# Patient Record
Sex: MALE | Race: BLACK OR AFRICAN AMERICAN | Employment: OTHER | URBAN - METROPOLITAN AREA
[De-identification: names, ages, dates, MRNs, and addresses within clinical notes are randomized per-mention and may not be internally consistent; named-entity substitution may affect disease eponyms.]

---

## 2018-04-17 DIAGNOSIS — K80.20 GALLSTONES: Primary | ICD-10-CM

## 2018-04-17 DIAGNOSIS — R10.11 RIGHT UPPER QUADRANT ABDOMINAL PAIN: ICD-10-CM

## 2018-04-26 ENCOUNTER — HOSPITAL ENCOUNTER (OUTPATIENT)
Dept: ULTRASOUND IMAGING | Age: 63
Discharge: HOME | End: 2018-04-26
Attending: SURGERY | Admitting: SURGERY

## 2018-04-26 DIAGNOSIS — K80.20 GALLSTONES: ICD-10-CM

## 2018-04-26 DIAGNOSIS — K80.20 CALCULUS OF GALLBLADDER WITHOUT CHOLECYSTITIS WITHOUT OBSTRUCTION: ICD-10-CM

## 2018-04-26 DIAGNOSIS — R10.11 RIGHT UPPER QUADRANT ABDOMINAL PAIN: ICD-10-CM

## 2018-05-01 ENCOUNTER — TELEPHONE (OUTPATIENT)
Dept: BARIATRICS/WEIGHT MGMT | Age: 63
End: 2018-05-01

## 2018-05-17 ENCOUNTER — INITIAL CONSULT (OUTPATIENT)
Dept: BARIATRICS/WEIGHT MGMT | Age: 63
End: 2018-05-17

## 2018-05-17 VITALS
BODY MASS INDEX: 28.86 KG/M2 | HEIGHT: 70 IN | DIASTOLIC BLOOD PRESSURE: 77 MMHG | SYSTOLIC BLOOD PRESSURE: 125 MMHG | WEIGHT: 201.6 LBS | HEART RATE: 78 BPM

## 2018-05-17 DIAGNOSIS — R10.11 RIGHT UPPER QUADRANT ABDOMINAL PAIN: ICD-10-CM

## 2018-05-17 DIAGNOSIS — K80.10 CHRONIC CHOLECYSTITIS WITH CALCULUS: Primary | ICD-10-CM

## 2018-05-17 PROCEDURE — 4040F PNEUMOC VAC/ADMIN/RCVD: CPT | Performed by: SURGERY

## 2018-05-17 PROCEDURE — G8427 DOCREV CUR MEDS BY ELIG CLIN: HCPCS | Performed by: SURGERY

## 2018-05-17 PROCEDURE — 99204 OFFICE O/P NEW MOD 45 MIN: CPT | Performed by: SURGERY

## 2018-05-17 PROCEDURE — 3017F COLORECTAL CA SCREEN DOC REV: CPT | Performed by: SURGERY

## 2018-05-17 PROCEDURE — G8419 CALC BMI OUT NRM PARAM NOF/U: HCPCS | Performed by: SURGERY

## 2018-05-17 PROCEDURE — 4004F PT TOBACCO SCREEN RCVD TLK: CPT | Performed by: SURGERY

## 2018-05-17 PROCEDURE — 1123F ACP DISCUSS/DSCN MKR DOCD: CPT | Performed by: SURGERY

## 2018-05-29 ENCOUNTER — TELEPHONE (OUTPATIENT)
Dept: BARIATRICS/WEIGHT MGMT | Age: 63
End: 2018-05-29

## 2018-05-30 ENCOUNTER — TELEPHONE (OUTPATIENT)
Dept: SURGERY | Age: 63
End: 2018-05-30

## 2018-05-31 ENCOUNTER — HOSPITAL ENCOUNTER (OUTPATIENT)
Dept: PREADMISSION TESTING | Age: 63
Discharge: HOME | End: 2018-06-01
Attending: SURGERY | Admitting: SURGERY

## 2018-05-31 VITALS — WEIGHT: 201 LBS | HEIGHT: 70 IN | BODY MASS INDEX: 28.77 KG/M2

## 2018-05-31 RX ORDER — HEPARIN SODIUM 5000 [USP'U]/ML
5000 INJECTION, SOLUTION INTRAVENOUS; SUBCUTANEOUS ONCE
Status: CANCELLED | OUTPATIENT
Start: 2018-05-31 | End: 2018-05-31

## 2018-06-01 PROBLEM — K80.20 SYMPTOMATIC CHOLELITHIASIS: Status: ACTIVE | Noted: 2018-06-01

## 2018-06-05 ENCOUNTER — TELEPHONE (OUTPATIENT)
Dept: BARIATRICS/WEIGHT MGMT | Age: 63
End: 2018-06-05

## 2018-06-05 DIAGNOSIS — Z90.49 S/P LAPAROSCOPIC CHOLECYSTECTOMY: Primary | ICD-10-CM

## 2018-06-06 RX ORDER — OXYCODONE AND ACETAMINOPHEN 10; 325 MG/1; MG/1
1 TABLET ORAL EVERY 6 HOURS PRN
Qty: 28 TABLET | Refills: 0 | Status: SHIPPED | OUTPATIENT
Start: 2018-06-06 | End: 2018-06-12 | Stop reason: CLARIF

## 2018-06-12 ENCOUNTER — TELEPHONE (OUTPATIENT)
Dept: SURGERY | Age: 63
End: 2018-06-12

## 2018-06-12 PROBLEM — R10.9 ABDOMINAL PAIN: Status: ACTIVE | Noted: 2018-06-12

## 2018-06-13 PROBLEM — K56.7 ILEUS (HCC): Status: ACTIVE | Noted: 2018-06-13

## 2018-06-27 ENCOUNTER — OFFICE VISIT (OUTPATIENT)
Dept: BARIATRICS/WEIGHT MGMT | Age: 63
End: 2018-06-27

## 2018-06-27 VITALS
WEIGHT: 200 LBS | SYSTOLIC BLOOD PRESSURE: 105 MMHG | HEIGHT: 71 IN | DIASTOLIC BLOOD PRESSURE: 67 MMHG | HEART RATE: 81 BPM | BODY MASS INDEX: 28 KG/M2

## 2018-06-27 DIAGNOSIS — Z90.49 S/P LAPAROSCOPIC CHOLECYSTECTOMY: Primary | ICD-10-CM

## 2018-06-27 PROCEDURE — 99024 POSTOP FOLLOW-UP VISIT: CPT | Performed by: SURGERY

## 2018-08-15 ENCOUNTER — APPOINTMENT (OUTPATIENT)
Dept: CT IMAGING | Age: 63
End: 2018-08-15
Payer: MEDICARE

## 2018-08-15 ENCOUNTER — HOSPITAL ENCOUNTER (EMERGENCY)
Age: 63
Discharge: HOME | End: 2018-08-16
Attending: EMERGENCY MEDICINE
Payer: MEDICARE

## 2018-08-15 DIAGNOSIS — R10.9 FLANK PAIN: Primary | ICD-10-CM

## 2018-08-15 LAB
ANION GAP SERPL CALCULATED.3IONS-SCNC: 11 MMOL/L (ref 3–16)
BASOPHILS ABSOLUTE: 0 K/UL (ref 0–0.2)
BASOPHILS RELATIVE PERCENT: 0.2 %
BUN BLDV-MCNC: 11 MG/DL (ref 7–20)
CALCIUM SERPL-MCNC: 9.8 MG/DL (ref 8.3–10.6)
CHLORIDE BLD-SCNC: 101 MMOL/L (ref 99–110)
CO2: 27 MMOL/L (ref 21–32)
CREAT SERPL-MCNC: 1 MG/DL (ref 0.8–1.3)
EOSINOPHILS ABSOLUTE: 0.2 K/UL (ref 0–0.6)
EOSINOPHILS RELATIVE PERCENT: 4.7 %
GFR AFRICAN AMERICAN: >60
GFR NON-AFRICAN AMERICAN: >60
GLUCOSE BLD-MCNC: 88 MG/DL (ref 70–99)
HCT VFR BLD CALC: 40.2 % (ref 40.5–52.5)
HEMOGLOBIN: 13.4 G/DL (ref 13.5–17.5)
LYMPHOCYTES ABSOLUTE: 2 K/UL (ref 1–5.1)
LYMPHOCYTES RELATIVE PERCENT: 44 %
MCH RBC QN AUTO: 33.2 PG (ref 26–34)
MCHC RBC AUTO-ENTMCNC: 33.2 G/DL (ref 31–36)
MCV RBC AUTO: 99.8 FL (ref 80–100)
MONOCYTES ABSOLUTE: 0.4 K/UL (ref 0–1.3)
MONOCYTES RELATIVE PERCENT: 9.3 %
NEUTROPHILS ABSOLUTE: 1.8 K/UL (ref 1.7–7.7)
NEUTROPHILS RELATIVE PERCENT: 41.8 %
PDW BLD-RTO: 14 % (ref 12.4–15.4)
PLATELET # BLD: 128 K/UL (ref 135–450)
PMV BLD AUTO: 9.7 FL (ref 5–10.5)
POTASSIUM SERPL-SCNC: 3.9 MMOL/L (ref 3.5–5.1)
RBC # BLD: 4.03 M/UL (ref 4.2–5.9)
SODIUM BLD-SCNC: 139 MMOL/L (ref 136–145)
WBC # BLD: 4.4 K/UL (ref 4–11)

## 2018-08-15 PROCEDURE — 96374 THER/PROPH/DIAG INJ IV PUSH: CPT

## 2018-08-15 PROCEDURE — 2580000003 HC RX 258: Performed by: PHYSICIAN ASSISTANT

## 2018-08-15 PROCEDURE — 74176 CT ABD & PELVIS W/O CONTRAST: CPT

## 2018-08-15 PROCEDURE — 96361 HYDRATE IV INFUSION ADD-ON: CPT

## 2018-08-15 PROCEDURE — 85025 COMPLETE CBC W/AUTO DIFF WBC: CPT

## 2018-08-15 PROCEDURE — 80048 BASIC METABOLIC PNL TOTAL CA: CPT

## 2018-08-15 PROCEDURE — 6360000002 HC RX W HCPCS: Performed by: PHYSICIAN ASSISTANT

## 2018-08-15 PROCEDURE — 99284 EMERGENCY DEPT VISIT MOD MDM: CPT

## 2018-08-15 RX ORDER — KETOROLAC TROMETHAMINE 30 MG/ML
15 INJECTION, SOLUTION INTRAMUSCULAR; INTRAVENOUS ONCE
Status: COMPLETED | OUTPATIENT
Start: 2018-08-15 | End: 2018-08-15

## 2018-08-15 RX ORDER — 0.9 % SODIUM CHLORIDE 0.9 %
1000 INTRAVENOUS SOLUTION INTRAVENOUS ONCE
Status: COMPLETED | OUTPATIENT
Start: 2018-08-15 | End: 2018-08-15

## 2018-08-15 RX ORDER — MORPHINE SULFATE 4 MG/ML
4 INJECTION, SOLUTION INTRAMUSCULAR; INTRAVENOUS
Status: DISCONTINUED | OUTPATIENT
Start: 2018-08-15 | End: 2018-08-15

## 2018-08-15 RX ADMIN — KETOROLAC TROMETHAMINE 15 MG: 30 INJECTION, SOLUTION INTRAMUSCULAR at 22:32

## 2018-08-15 RX ADMIN — SODIUM CHLORIDE 1000 ML: 9 INJECTION, SOLUTION INTRAVENOUS at 22:23

## 2018-08-15 ASSESSMENT — PAIN DESCRIPTION - FREQUENCY: FREQUENCY: CONTINUOUS

## 2018-08-15 ASSESSMENT — PAIN SCALES - WONG BAKER: WONGBAKER_NUMERICALRESPONSE: 0

## 2018-08-15 ASSESSMENT — PAIN SCALES - GENERAL
PAINLEVEL_OUTOF10: 10
PAINLEVEL_OUTOF10: 10

## 2018-08-15 ASSESSMENT — ENCOUNTER SYMPTOMS
NAUSEA: 0
VOMITING: 0
DIARRHEA: 0
ABDOMINAL PAIN: 0
BACK PAIN: 1

## 2018-08-15 ASSESSMENT — PAIN DESCRIPTION - ONSET: ONSET: ON-GOING

## 2018-08-15 ASSESSMENT — PAIN DESCRIPTION - DESCRIPTORS: DESCRIPTORS: SHARP

## 2018-08-15 ASSESSMENT — PAIN DESCRIPTION - PROGRESSION: CLINICAL_PROGRESSION: GRADUALLY WORSENING

## 2018-08-15 ASSESSMENT — PAIN DESCRIPTION - ORIENTATION: ORIENTATION: RIGHT

## 2018-08-15 ASSESSMENT — PAIN DESCRIPTION - PAIN TYPE: TYPE: ACUTE PAIN

## 2018-08-15 ASSESSMENT — PAIN DESCRIPTION - LOCATION: LOCATION: FLANK

## 2018-08-16 VITALS
OXYGEN SATURATION: 100 % | DIASTOLIC BLOOD PRESSURE: 88 MMHG | SYSTOLIC BLOOD PRESSURE: 176 MMHG | HEART RATE: 63 BPM | RESPIRATION RATE: 16 BRPM | TEMPERATURE: 97.9 F

## 2018-08-16 LAB
BILIRUBIN URINE: NEGATIVE MG/DL
BLOOD, URINE: ABNORMAL
CLARITY: ABNORMAL
COLOR: ABNORMAL
GLUCOSE URINE: NEGATIVE MG/DL
KETONES, URINE: NEGATIVE MG/DL
LEUKOCYTE ESTERASE, URINE: NEGATIVE
MICROSCOPIC EXAMINATION: YES
NITRITE, URINE: NEGATIVE
PH UA: 7
PROTEIN UA: NEGATIVE MG/DL
RBC UA: ABNORMAL /HPF (ref 0–2)
SPECIFIC GRAVITY UA: 1.01
UROBILINOGEN, URINE: 0.2 E.U./DL
WBC UA: ABNORMAL /HPF (ref 0–5)

## 2018-08-16 PROCEDURE — 81001 URINALYSIS AUTO W/SCOPE: CPT

## 2018-08-16 PROCEDURE — 6370000000 HC RX 637 (ALT 250 FOR IP): Performed by: PHYSICIAN ASSISTANT

## 2018-08-16 RX ORDER — LIDOCAINE 50 MG/G
1 PATCH TOPICAL DAILY
Qty: 30 PATCH | Refills: 0 | Status: SHIPPED | OUTPATIENT
Start: 2018-08-16

## 2018-08-16 RX ORDER — LIDOCAINE 50 MG/G
1 PATCH TOPICAL DAILY
Status: DISCONTINUED | OUTPATIENT
Start: 2018-08-16 | End: 2018-08-16 | Stop reason: HOSPADM

## 2018-08-16 RX ORDER — LIDOCAINE 50 MG/G
1 PATCH TOPICAL DAILY
Status: DISCONTINUED | OUTPATIENT
Start: 2018-08-16 | End: 2018-08-16

## 2018-08-16 RX ORDER — NAPROXEN 375 MG/1
375 TABLET ORAL 2 TIMES DAILY WITH MEALS
Qty: 14 TABLET | Refills: 0 | Status: SHIPPED | OUTPATIENT
Start: 2018-08-16 | End: 2021-07-27 | Stop reason: ALTCHOICE

## 2018-08-16 NOTE — ED PROVIDER NOTES
HFA) 108 (90 BASE) MCG/ACT INHALER    Inhale 2 puffs into the lungs every 6 hours as needed for Wheezing or Shortness of Breath. CALCIUM CARBONATE-VITAMIN D (CALCIUM 500/D) 500-125 MG-UNIT TABS    Take by mouth    DIAZEPAM (VALIUM) 10 MG TABLET    Take 1 tablet by mouth 3 times daily    DOCUSATE SODIUM (COLACE) 100 MG CAPSULE    Take 1 capsule by mouth 2 times daily. LITHIUM (LITHOBID) 300 MG CR TABLET    Take 2 tablets by mouth 2 times daily    PSYLLIUM 63 % POWD    Take 7.5 mLs by mouth daily. QUETIAPINE (SEROQUEL) 300 MG TABLET    1-2 tablets nightly as tolerated    TERAZOSIN (HYTRIN) 2 MG CAPSULE    Take 1 capsule by mouth nightly. Allergies     He is allergic to morphine. Physical Exam     INITIAL VITALS: BP: (!) 152/84, Temp: 97.9 °F (36.6 °C), Pulse: 69, Resp: 16, SpO2: 99 %   Physical Exam   Constitutional: He is oriented to person, place, and time. He appears well-developed and well-nourished. No distress. HENT:   Head: Normocephalic and atraumatic. Eyes: Conjunctivae are normal.   Neck: Neck supple. Cardiovascular: Normal rate. Pulmonary/Chest: Effort normal.   Abdominal: Soft. There is tenderness. +R CVAT   Neurological: He is alert and oriented to person, place, and time. Skin: Skin is warm and dry. No rash noted. Psychiatric: He has a normal mood and affect. His behavior is normal.   Nursing note and vitals reviewed. Diagnostic Results     RADIOLOGY:  CT ABDOMEN PELVIS WO CONTRAST Additional Contrast? None   Final Result      1. No acute abdomen normality of abdomen or pelvis   2.  No urinary calculi or hydronephrosis        LABS:   Results for orders placed or performed during the hospital encounter of 08/15/18   CBC auto differential   Result Value Ref Range    WBC 4.4 4.0 - 11.0 K/uL    RBC 4.03 (L) 4.20 - 5.90 M/uL    Hemoglobin 13.4 (L) 13.5 - 17.5 g/dL    Hematocrit 40.2 (L) 40.5 - 52.5 %    MCV 99.8 80.0 - 100.0 fL    MCH 33.2 26.0 - 34.0 pg    MCHC 33.2 31.0 - 36.0 g/dL    RDW 14.0 12.4 - 15.4 %    Platelets 763 (L) 523 - 450 K/uL    MPV 9.7 5.0 - 10.5 fL    Neutrophils % 41.8 %    Lymphocytes % 44.0 %    Monocytes % 9.3 %    Eosinophils % 4.7 %    Basophils % 0.2 %    Neutrophils # 1.8 1.7 - 7.7 K/uL    Lymphocytes # 2.0 1.0 - 5.1 K/uL    Monocytes # 0.4 0.0 - 1.3 K/uL    Eosinophils # 0.2 0.0 - 0.6 K/uL    Basophils # 0.0 0.0 - 0.2 K/uL   Basic Metabolic Panel   Result Value Ref Range    Sodium 139 136 - 145 mmol/L    Potassium 3.9 3.5 - 5.1 mmol/L    Chloride 101 99 - 110 mmol/L    CO2 27 21 - 32 mmol/L    Anion Gap 11 3 - 16    Glucose 88 70 - 99 mg/dL    BUN 11 7 - 20 mg/dL    CREATININE 1.0 0.8 - 1.3 mg/dL    GFR Non-African American >60 >60    GFR African American >60 >60    Calcium 9.8 8.3 - 10.6 mg/dL   POC URINE with Microscopic   Result Value Ref Range    Color, UA Not Entered Straw/Yellow    Clarity, UA Not Entered Clear    Glucose, Ur Negative Negative mg/dL    Bilirubin Urine Negative Negative mg/dL    Ketones, Urine Negative Negative mg/dL    Specific Gravity, UA 1.010 1.005 - 1.030    Blood, Urine TRACE-INTACT (A) Negative    pH, UA 7.0 5.0 - 8.0    Protein, UA Negative Negative mg/dL    Urobilinogen, Urine 0.2 <2.0 E.U./dL    Nitrite, Urine Negative Negative    Leukocyte Esterase, Urine Negative Negative    Microscopic Examination Yes        RECENT VITALS:  BP: 130/85, Temp: 97.9 °F (36.6 °C), Pulse: 63, Resp: 16, SpO2: 100 %     Procedures     None    ED Course     Nursing Notes, Past Medical Hx, Past Surgical Hx, Social Hx, Allergies, and Family Hx were reviewed.     The patient was given the following medications:  Orders Placed This Encounter   Medications    DISCONTD: morphine (PF) injection 4 mg    0.9 % sodium chloride bolus    ketorolac (TORADOL) injection 15 mg    naproxen (NAPROSYN) 375 MG tablet     Sig: Take 1 tablet by mouth 2 times daily (with meals) for 7 days     Dispense:  14 tablet     Refill:  0    lidocaine (LIDODERM) 5 %     Sig:

## 2018-08-16 NOTE — ED PROVIDER NOTES
ED Attending Attestation Note     Date of evaluation: 8/15/2018    This patient was seen by the advance practice provider. I have seen and examined the patient, agree with the workup, evaluation, management and diagnosis. The care plan has been discussed. My assessment reveals A patient who presents with a multiple week history of right-sided discomfort. On my examination it is worse with palpation and movement. The pain appears to be musculoskeletal in nature. He is on multiple medications including oxycodone, Valium, lithium, and Seroquel. Dipti Hamm MD  08/15/18 [id number]

## 2018-10-23 ENCOUNTER — APPOINTMENT (OUTPATIENT)
Dept: GENERAL RADIOLOGY | Age: 63
End: 2018-10-23
Payer: MEDICARE

## 2018-10-23 ENCOUNTER — HOSPITAL ENCOUNTER (EMERGENCY)
Age: 63
Discharge: HOME | End: 2018-10-23
Attending: EMERGENCY MEDICINE
Payer: MEDICARE

## 2018-10-23 VITALS
OXYGEN SATURATION: 98 % | SYSTOLIC BLOOD PRESSURE: 153 MMHG | WEIGHT: 200 LBS | RESPIRATION RATE: 13 BRPM | HEART RATE: 79 BPM | TEMPERATURE: 98.5 F | DIASTOLIC BLOOD PRESSURE: 86 MMHG | BODY MASS INDEX: 28 KG/M2 | HEIGHT: 71 IN

## 2018-10-23 DIAGNOSIS — S73.005A DISLOCATION OF LEFT HIP, INITIAL ENCOUNTER (HCC): Primary | ICD-10-CM

## 2018-10-23 PROCEDURE — 6360000002 HC RX W HCPCS

## 2018-10-23 PROCEDURE — 94761 N-INVAS EAR/PLS OXIMETRY MLT: CPT

## 2018-10-23 PROCEDURE — 94664 DEMO&/EVAL PT USE INHALER: CPT

## 2018-10-23 PROCEDURE — 2700000000 HC OXYGEN THERAPY PER DAY

## 2018-10-23 PROCEDURE — 99284 EMERGENCY DEPT VISIT MOD MDM: CPT

## 2018-10-23 PROCEDURE — 73502 X-RAY EXAM HIP UNI 2-3 VIEWS: CPT

## 2018-10-23 PROCEDURE — 73501 X-RAY EXAM HIP UNI 1 VIEW: CPT

## 2018-10-23 PROCEDURE — 4500000024 HC ED LEVEL 4 PROCEDURE

## 2018-10-23 PROCEDURE — 94770 HC ETCO2 MONITOR DAILY: CPT

## 2018-10-23 RX ORDER — SODIUM CHLORIDE 9 MG/ML
INJECTION, SOLUTION INTRAVENOUS
Status: DISCONTINUED
Start: 2018-10-23 | End: 2018-10-23 | Stop reason: HOSPADM

## 2018-10-23 RX ORDER — PROPOFOL 10 MG/ML
INJECTION, EMULSION INTRAVENOUS
Status: COMPLETED
Start: 2018-10-23 | End: 2018-10-23

## 2018-10-23 RX ADMIN — PROPOFOL 40 MG: 10 INJECTION, EMULSION INTRAVENOUS at 14:18

## 2018-10-23 RX ADMIN — PROPOFOL 40 MG: 10 INJECTION, EMULSION INTRAVENOUS at 14:16

## 2018-10-23 RX ADMIN — PROPOFOL 20 MG: 10 INJECTION, EMULSION INTRAVENOUS at 14:19

## 2018-10-23 ASSESSMENT — PAIN SCALES - GENERAL
PAINLEVEL_OUTOF10: 8
PAINLEVEL_OUTOF10: 7

## 2018-10-23 ASSESSMENT — PAIN SCALES - WONG BAKER: WONGBAKER_NUMERICALRESPONSE: 8

## 2018-10-23 ASSESSMENT — PAIN DESCRIPTION - LOCATION: LOCATION: HIP

## 2018-10-23 ASSESSMENT — PAIN DESCRIPTION - DESCRIPTORS: DESCRIPTORS: THROBBING

## 2018-10-23 ASSESSMENT — PAIN DESCRIPTION - ORIENTATION: ORIENTATION: LEFT

## 2018-10-23 NOTE — PROGRESS NOTES
ETCO2  Monitoring done for conscious sedation. Patient is on 3 liters/min via nasal cannula for procedure.     Baseline information:  HR: 85 BP: 143/79  RR: 25 LOC: alert  ETCO2: 36 SpO2: 100    5 minutes after sedation administered:  HR: 96 BP: 154/86  RR: 22 LOC: lethargic  ETCO2: 21 SpO2: 99    Post-Procedure:  HR: 86 BP: 152/69  RR: 17 LOC: alert  ETCO2: 39 SpO2: 100  well    Patient/caregiver was educated on the proper method of use:  Yes      Level of patient/caregiver understanding able to:   [] Verbalize understanding   [x] Demonstrate understanding       [] Teach back        [] Needs reinforcement       []  No available caregiver               []  Other:     Response to education:  Good

## 2018-10-23 NOTE — ED PROVIDER NOTES
(90 BASE) MCG/ACT INHALER    Inhale 2 puffs into the lungs every 6 hours as needed for Wheezing or Shortness of Breath. CALCIUM CARBONATE-VITAMIN D (CALCIUM 500/D) 500-125 MG-UNIT TABS    Take by mouth    DIAZEPAM (VALIUM) 10 MG TABLET    Take 1 tablet by mouth 3 times daily    DOCUSATE SODIUM (COLACE) 100 MG CAPSULE    Take 1 capsule by mouth 2 times daily. LIDOCAINE (LIDODERM) 5 %    Place 1 patch onto the skin daily 12 hours on, 12 hours off. LITHIUM (LITHOBID) 300 MG CR TABLET    Take 2 tablets by mouth 2 times daily    NAPROXEN (NAPROSYN) 375 MG TABLET    Take 1 tablet by mouth 2 times daily (with meals) for 7 days    PSYLLIUM 63 % POWD    Take 7.5 mLs by mouth daily. QUETIAPINE (SEROQUEL) 300 MG TABLET    1-2 tablets nightly as tolerated    TERAZOSIN (HYTRIN) 2 MG CAPSULE    Take 1 capsule by mouth nightly. Allergies     He is allergic to morphine. Physical Exam     INITIAL VITALS: BP: (!) 148/71, Temp: 98.5 °F (36.9 °C), Pulse: 81, Resp: 18, SpO2: 100 %   Physical Exam   Constitutional: He is oriented to person, place, and time. No distress. HENT:   Head: Normocephalic. Eyes: Pupils are equal, round, and reactive to light. Neck: Neck supple. Pulmonary/Chest: Effort normal.   Abdominal: Soft. Musculoskeletal:   Pain over left hip with movement but recently had surgery for hip replacement. Suture line looks good. Steri-Strips in place. No erythema or warmth   Neurological: He is alert and oriented to person, place, and time. Skin: Skin is warm and dry. Positive mild shortening of that leg with rotation internally    Diagnostic Results     EKG       RADIOLOGY:  XR HIP 1 VW W PELVIS LEFT   Final Result   Findings/impression: Interval reduction  of the previously dislocated left hip arthroplasty. The right hip arthroplasty appears to be intact. XR HIP 2-3 VW W PELVIS LEFT   Final Result          LABS:   No results found for this visit on 10/23/18.     ED BEDSIDE complications      Left hip was relocated by resident under my direct supervision. Traction countertraction with audible and palpable click. Neurovascular intact after reduction. ED Course     Nursing Notes, Past Medical Hx, Past Surgical Hx, Social Hx,Allergies, and Family Hx were reviewed. The patient was given the following medications:  Orders Placed This Encounter   Medications    sodium chloride 0.9 % infusion     JOSEPH TODD HUNTER: cabinet override    propofol 200 MG/20ML injection     CHIRAG KUHN: cabinet override       CONSULTS:  None    MEDICAL DECISIONMAKING / ASSESSMENT / Britney Jesu is a 70 y.o. male who presented with left hip dislocation. I discussed the case in detail with his surgeon at the [facility] in [place]. Patient is noncompliant with rehab in addition to history of cocaine use in his history. Patient had moderate sedation followed by reduction relocation of the left hip and repeat imaging shows in place. He was instructed to call his doctor at the [facility] today to be follow-up in the next day or 2. He verbalized understanding and will be discharged in good condition. Family is at [facility] understands and agrees with plan. Clinical Impression     1. Dislocation of left hip, initial encounter [facility])        Disposition     PATIENT REFERRED TO:    Follow-up with your orthopedic doctor at the [facility].  Call for appointment to be seen tomorrow or the next day.   Schedule an appointment as soon as possible for a visit         DISCHARGE MEDICATIONS:  New Prescriptions    No medications on file       DISPOSITION Decision To Discharge 10/23/2018 02:41:09 PM       Mamie Monae MD  10/23/18 [id number]

## 2019-06-03 ENCOUNTER — OFFICE VISIT (OUTPATIENT)
Dept: ORTHOPEDIC SURGERY | Age: 64
End: 2019-06-03
Payer: OTHER GOVERNMENT

## 2019-06-03 VITALS — HEIGHT: 71 IN | BODY MASS INDEX: 27.99 KG/M2 | WEIGHT: 199.96 LBS

## 2019-06-03 DIAGNOSIS — Z96.652 HISTORY OF TOTAL LEFT KNEE REPLACEMENT: ICD-10-CM

## 2019-06-03 DIAGNOSIS — S73.005S DISLOCATION OF LEFT HIP, SEQUELA: ICD-10-CM

## 2019-06-03 DIAGNOSIS — R52 PAIN: Primary | ICD-10-CM

## 2019-06-03 DIAGNOSIS — Z96.642 HISTORY OF TOTAL LEFT HIP REPLACEMENT: ICD-10-CM

## 2019-06-03 PROCEDURE — 99203 OFFICE O/P NEW LOW 30 MIN: CPT | Performed by: ORTHOPAEDIC SURGERY

## 2019-06-03 NOTE — PROGRESS NOTES
CHIEF COMPLAINT:    Chief Complaint   Patient presents with    Knee Pain     LEFT KNEE. [facility]. HISTORY OF PRESENT ILLNESS:                The patient is a 75 y.o. male who presents to clinic for evaluation of left knee pain. He has a history of total knee replacement performed about 15 years ago at the [facility]. Is done very well with this hip replacement until about one year ago. We did not time frame, he underwent an ipsilateral total hip replacement which, from the patient's story, sounds like it became infected immediately postop. He underwent a liner exchange followed by IV and oral antibiotics. He states that he then suffered a dislocation with this hip and has had a total of 4 dislocations of this hip with the most recent being in February 2019. Since his most recent dislocation, he has been in multiple nursing homes for rehabilitation. Recently, he has noticed pain in the knee during his physical therapy. He reports of valgus of instability with the knee.     Past Medical History:   Diagnosis Date    Anxiety 8/27/2014    Bipolar affective disorder ([address] 8/27/2014    Chronic back pain 8/27/2014    Chronic pain syndrome 8/27/2014    Constipation 8/27/2014    Depression 8/27/2014    Frequent falls 8/27/2014    GERD (gastroesophageal reflux disease) 8/27/2014    Insomnia 8/27/2014    Osteoarthritis 8/27/2014    Unsteady gait 8/27/2014        Work Status: Retired    The pain assessment was noted & is as follows:  Pain Assessment  Location of Pain: Knee  Location Modifiers: Left  Severity of Pain: 6  Quality of Pain: Aching, Dull, Sharp  Duration of Pain: Persistent  Frequency of Pain: Intermittent  Aggravating Factors: Stretching, Bending, Standing  Limiting Behavior: Some  Relieving Factors: Rest  Result of Injury: No  Work-Related Injury: No  Are there other pain locations you wish to document?: No]      Work Status/Functionality:     Past Medical History: Medical history form was reviewed today & can be found in the media tab  Past Medical History:   Diagnosis Date    Anxiety 8/27/2014    Bipolar affective disorder ([facility] [address] 8/27/2014    Chronic back pain 8/27/2014    Chronic pain syndrome 8/27/2014    Constipation 8/27/2014    Depression 8/27/2014    Frequent falls 8/27/2014    GERD (gastroesophageal reflux disease) 8/27/2014    Insomnia 8/27/2014    Osteoarthritis 8/27/2014    Unsteady gait 8/27/2014      Past Surgical History:     Past Surgical History:   Procedure Laterality Date    CHOLECYSTECTOMY, LAPAROSCOPIC  06/01/2018    LAPAROSCOPIC CHOLECYSTECTOMY, LAPAROSCOPIC VENTRAL HERNIA REPAIR REDUCABLE    [address] [facility], [address] [facility], [address] [facility], [facility] [address]  2012    0 [facility], [address] [facility] Right 2008 - 2010    0 [facility], [facility] Right 2007 - 2008    0 [facility], [address] [facility] Left 2006    [address] [facility], [address] [facility]  2007    [address] [facility]     Current Medications:     Current Outpatient Medications:     naproxen (NAPROSYN) 375 MG tablet, Take 1 tablet by mouth 2 times daily (with meals) for 7 days, Disp: 14 tablet, Rfl: 0    lidocaine (LIDODERM) 5 %, Place 1 patch onto the skin daily 12 hours on, 12 hours off., Disp: 30 patch, Rfl: 0    Calcium Carbonate-Vitamin D (CALCIUM 500/D) 500-125 MG-UNIT TABS, Take by mouth, Disp: , Rfl:     QUEtiapine (SEROQUEL) 300 MG tablet, 1-2 tablets nightly as tolerated, Disp: 14 tablet, Rfl: 1    diazepam (VALIUM) 10 MG tablet, Take 1 tablet by mouth 3 times daily, Disp: 21 tablet, Rfl: 0   lithium (LITHOBID) 300 MG CR tablet, Take 2 tablets by mouth 2 times daily, Disp: , Rfl:     albuterol (PROAIR HFA) 108 (90 BASE) MCG/ACT inhaler, Inhale 2 puffs into the lungs every 6 hours as needed for Wheezing or Shortness of Breath., Disp: 1 Inhaler, Rfl: 12    Psyllium 63 % POWD, Take 7.5 mLs by mouth daily. , Disp: , Rfl:     terazosin (HYTRIN) 2 MG capsule, Take 1 capsule by mouth nightly., Disp: , Rfl:     docusate sodium (COLACE) 100 MG capsule, Take 1 capsule by mouth 2 times daily. , Disp: , Rfl:   Allergies:  Morphine  Social History:    reports that he has been smoking cigarettes. He has never used smokeless tobacco. He reports that he drinks alcohol. He reports that he does not use drugs. Family History:   Family History   Problem Relation Age of Onset    High Blood Pressure Mother     Diabetes Father     High Blood Pressure Father     Stroke Father     Heart Disease Sister     High Blood Pressure Sister     Diabetes Brother        REVIEW OF SYSTEMS:   For new problems, a full review of systems will be found scanned in the patient's chart. CONSTITUTIONAL: Denies unexplained weight loss, fevers, chills   NEUROLOGICAL: Denies unsteady gait or progressive weakness  SKIN: Denies skin changes, delayed healing, rash, itching       PHYSICAL EXAM:    Vitals: Height 5' 10.98\" (1.803 m), weight 199 lb 15.3 oz (90.7 kg). GENERAL EXAM:  · General Apparence: Patient is adequately groomed with no evidence of malnutrition. · Orientation: The patient is oriented to time, place and person. · Mood & Affect:The patient's mood and affect are appropriate       left knee PHYSICAL EXAMINATION:  · Inspection:  Well-healed surgical incision is noted at the knee. No significant edema, erythema or ecchymosis    · Palpation:  Mild tenderness to palpation along the medial posterior aspects of the knee      · Range of Motion: He lacks the last 2-3° of extension.   Flexion is limited to approximately 120°    · Strength: No gross strength deficits are noted    · Special Tests:  I am unable to reproduce any instability in the knee. Neurovascular exam is intact to the distal extremity. Negative Homans testing. Negative logroll testing. · Skin:  There are no rashes, ulcerations or lesions. · There are no dysvascular changes     Gait & station: Patient is in a motorized wheelchair today      Additional Examinations:        Right Lower Extremity: Examination of the right lower extremity does not show any tenderness, deformity or injury. Range of motion is unremarkable. There is no gross instability. There are no rashes, ulcerations or lesions. Strength and tone are normal.    Lamination of the left hip reveals no pain with gentle range of motion of the hip. Overall strength is mildly diminished with the left compared to the right. Neurovascular exam is intact to the distal extremity. Diagnostic Testing: The following x rays were read and interpreted by myself      1.  3 x-rays of the left knee were taken today and reveal a cruciate retaining total knee replacement with an all poly-tibial tray. No signs of loosening and no acute x-ray findings    Orders     Orders Placed This Encounter   Procedures    XR KNEE LEFT (3 VIEWS)     Standing Status:   Future     Number of Occurrences:   1     Standing Expiration Date:   6/3/2020    CBC WITH AUTO DIFFERENTIAL     Standing Status:   Future     Standing Expiration Date:   6/3/2020    C-Reactive Protein     Standing Status:   Future     Standing Expiration Date:   6/3/2020    Sedimentation Rate     Standing Status:   Future     Standing Expiration Date:   6/3/2020         Assessment / Treatment Plan:     1. History of left total knee replacement    2. History left total hip replacement    3. History of multiple left hip dislocations and infection    We'll lengthy discussion with the patient regarding his symptoms and his complex case.   Regarding the knee, we would like to obtain some screening lab work to rule out a possible infection in the joint. I also discussed with the patient that his feeling of instability with the knee may in fact be coming from his hip. I also discussed with the patient that he may not yet be done with this treatment for the hip given his multiple dislocations. He is going to obtain some screening lab work and he will call the office to review his lab work. We will make a land for him after reviewing his screening lab work. If positive, we may need to perform a synoasure of the knee. I have personally performed and/or participated in the history, exam and medical decision making and agree with all pertinent clinical information. I have also reviewed and agree with the past medical, family and social history unless otherwise noted. This dictation was performed with a verbal recognition program (DRAGON) and it was checked for errors. It is possible that there are still dictated errors within this office note. If so, please bring any errors to my attention for an addendum. All efforts were made to ensure that this office note is accurate.           Ezra Mace MD

## 2019-06-20 ENCOUNTER — TELEPHONE (OUTPATIENT)
Dept: ORTHOPEDIC SURGERY | Age: 64
End: 2019-06-20

## 2019-06-20 NOTE — TELEPHONE ENCOUNTER
I spoke with the patient on the phone today regarding his elevated lab work with his C-reactive protein and sed rate.   He is going to call the office to schedule an appointment to have a synovasure performed to the left knee

## 2019-06-26 ENCOUNTER — OFFICE VISIT (OUTPATIENT)
Dept: ORTHOPEDIC SURGERY | Age: 64
End: 2019-06-26
Payer: OTHER GOVERNMENT

## 2019-06-26 VITALS — HEIGHT: 71 IN | BODY MASS INDEX: 28.14 KG/M2 | WEIGHT: 201 LBS

## 2019-06-26 DIAGNOSIS — T84.84XA PAIN DUE TO TOTAL LEFT KNEE REPLACEMENT, INITIAL ENCOUNTER (HCC): ICD-10-CM

## 2019-06-26 DIAGNOSIS — Z96.652 PAIN DUE TO TOTAL LEFT KNEE REPLACEMENT, INITIAL ENCOUNTER (HCC): ICD-10-CM

## 2019-06-26 DIAGNOSIS — Z96.652 HISTORY OF TOTAL LEFT KNEE REPLACEMENT: Primary | ICD-10-CM

## 2019-06-26 PROCEDURE — 20610 DRAIN/INJ JOINT/BURSA W/O US: CPT | Performed by: PHYSICIAN ASSISTANT

## 2019-06-26 PROCEDURE — 99999 PR OFFICE/OUTPT VISIT,PROCEDURE ONLY: CPT | Performed by: PHYSICIAN ASSISTANT

## 2019-06-26 NOTE — PROGRESS NOTES
perform the same with the hip to rule out an active infection in the hip. This will likely need to be managed by Dr. Penny Carrillo in the future given the complex nature of this patient's history. We will discuss the next steps in his treatment and we call to review the synovial results. Jacky Jung, [facility]    This dictation was performed with a verbal recognition program [facility] ) and it was checked for errors. It is possible that there are still dictated errors within this office note. If so, please bring any errors to my attention for an addendum. All efforts were made to ensure that this office note is accurate.

## 2019-06-27 ENCOUNTER — TELEPHONE (OUTPATIENT)
Dept: ORTHOPEDIC SURGERY | Age: 64
End: 2019-06-27

## 2019-06-27 NOTE — TELEPHONE ENCOUNTER
FAXED [id number] [facility] ([facility]) DOS 6/3/19 TO Lottie Garza AT [address] @ [phone].

## 2019-07-30 DIAGNOSIS — M25.562 LEFT KNEE PAIN, UNSPECIFIED CHRONICITY: Primary | ICD-10-CM

## 2020-09-30 ENCOUNTER — TELEPHONE (OUTPATIENT)
Dept: FAMILY MEDICINE CLINIC | Age: 65
End: 2020-09-30

## 2021-07-27 ENCOUNTER — APPOINTMENT (OUTPATIENT)
Dept: VASCULAR LAB | Age: 66
End: 2021-07-27
Payer: COMMERCIAL

## 2021-07-27 ENCOUNTER — APPOINTMENT (OUTPATIENT)
Dept: GENERAL RADIOLOGY | Age: 66
End: 2021-07-27
Payer: COMMERCIAL

## 2021-07-27 ENCOUNTER — HOSPITAL ENCOUNTER (EMERGENCY)
Age: 66
Discharge: HOME | End: 2021-07-27
Attending: EMERGENCY MEDICINE
Payer: COMMERCIAL

## 2021-07-27 VITALS
TEMPERATURE: 98 F | OXYGEN SATURATION: 100 % | RESPIRATION RATE: 19 BRPM | SYSTOLIC BLOOD PRESSURE: 136 MMHG | DIASTOLIC BLOOD PRESSURE: 77 MMHG | HEART RATE: 75 BPM

## 2021-07-27 DIAGNOSIS — S86.911A STRAIN OF RIGHT KNEE, INITIAL ENCOUNTER: Primary | ICD-10-CM

## 2021-07-27 DIAGNOSIS — M71.21 BAKER'S CYST OF KNEE, RIGHT: ICD-10-CM

## 2021-07-27 PROCEDURE — 6370000000 HC RX 637 (ALT 250 FOR IP): Performed by: EMERGENCY MEDICINE

## 2021-07-27 PROCEDURE — 73562 X-RAY EXAM OF KNEE 3: CPT

## 2021-07-27 PROCEDURE — 93971 EXTREMITY STUDY: CPT

## 2021-07-27 PROCEDURE — 99284 EMERGENCY DEPT VISIT MOD MDM: CPT

## 2021-07-27 RX ORDER — LIDOCAINE 4 G/G
1 PATCH TOPICAL DAILY
Status: DISCONTINUED | OUTPATIENT
Start: 2021-07-27 | End: 2021-07-27 | Stop reason: HOSPADM

## 2021-07-27 RX ORDER — NAPROXEN SODIUM 550 MG/1
550 TABLET ORAL 2 TIMES DAILY WITH MEALS
Qty: 14 TABLET | Refills: 0 | Status: SHIPPED | OUTPATIENT
Start: 2021-07-27 | End: 2021-08-03

## 2021-07-27 NOTE — ED PROVIDER NOTES
[address] [facility]          ATTENDING PHYSICIAN NOTE       Date of evaluation: 7/27/2021    Chief Complaint     Knee Pain (right knee pain that started last week and after he went camping on Friday he thinks he may have twisted it. pt states he has not attempted any pain medications.)      History of Present Illness     Richelle Gillette is a 74 y.o. male who presents complaints of right knee pain. Patient had previous surgery on this right knee and states he was camping recently when he stepped in a hole and twisted his knee 3 days ago. This patient has had bilateral hip and bilateral knee replacements and mostly uses a walker and a scooter. Pain is level 5 out of 10 dull pain. He denies any fever chills or sweats. Denies any increased warmth over the joint. Review of Systems     Review of Systems   Constitutional: Negative for chills, fatigue and fever. Musculoskeletal: Positive for arthralgias. Neurological: Negative. Psychiatric/Behavioral: Negative. Past Medical, Surgical, Family, and Social History     He has a past medical history of Anxiety, Bipolar affective disorder ([address] Chronic back pain, Chronic pain syndrome, Constipation, Depression, Frequent falls, GERD (gastroesophageal reflux disease), Insomnia, Osteoarthritis, and Unsteady gait. He has a past surgical history that includes Colonoscopy; Colonoscopy; Colonoscopy; Colonoscopy (2012); Upper gastrointestinal endoscopy (2007); Total knee arthroplasty (Left, 2006); Total hip arthroplasty (Right, 2007 - 2008); Rotator cuff repair (Right, 2008 - 2010); and Cholecystectomy, laparoscopic (06/01/2018). His family history includes Diabetes in his brother and father; Heart Disease in his sister; High Blood Pressure in his father, mother, and sister; Stroke in his father. He reports that he has been smoking cigarettes. He has never used smokeless tobacco. He reports current alcohol use.  He reports that he does not use drugs. Medications     Previous Medications    ALBUTEROL (PROAIR HFA) 108 (90 BASE) MCG/ACT INHALER    Inhale 2 puffs into the lungs every 6 hours as needed for Wheezing or Shortness of Breath. CALCIUM CARBONATE-VITAMIN D (CALCIUM 500/D) 500-125 MG-UNIT TABS    Take by mouth    DIAZEPAM (VALIUM) 10 MG TABLET    Take 1 tablet by mouth 3 times daily    DOCUSATE SODIUM (COLACE) 100 MG CAPSULE    Take 1 capsule by mouth 2 times daily. LIDOCAINE (LIDODERM) 5 %    Place 1 patch onto the skin daily 12 hours on, 12 hours off. LITHIUM (LITHOBID) 300 MG CR TABLET    Take 2 tablets by mouth 2 times daily    PSYLLIUM 63 % POWD    Take 7.5 mLs by mouth daily. QUETIAPINE (SEROQUEL) 300 MG TABLET    1-2 tablets nightly as tolerated    TERAZOSIN (HYTRIN) 2 MG CAPSULE    Take 1 capsule by mouth nightly. Allergies     He is allergic to morphine. Physical Exam     INITIAL VITALS: BP: (!) 141/76, Temp: 98 °F (36.7 °C), Pulse: 75, Resp: 19, SpO2: 100 %   Physical Exam  Vitals reviewed. Constitutional:       General: He is not in acute distress. Appearance: Normal appearance. He is normal weight. He is not toxic-appearing. Musculoskeletal:      Comments: Patient has a scar over the  anterior portion of his right knee. There is no obvious effusion or redness about the joint. The joint is not warm compared to the left side. He has good range of motion with pain starting at approximately 80 degrees. Has some mild swelling of his right ankle compared to the left. Tenderness posterior knee. No obvious mass. Neurological:      Mental Status: He is alert. Diagnostic Results         RADIOLOGY:  VL Extremity Venous Right         XR KNEE RIGHT (3 VIEWS)   Final Result      No acute fracture seen. LABS:   No results found for this visit on 07/27/21.         RECENT VITALS:  BP: 136/77,Temp: 98 °F (36.7 °C), Pulse: 75, Resp: 19, SpO2: 100 %     Procedures         ED Course     Nursing Notes, Past Medical Hx, Past Surgical Hx, Social Hx,Allergies, and Family Hx were reviewed. patient was given the following medications:  Orders Placed This Encounter   Medications    lidocaine 4 % external patch 1 patch    naproxen sodium (ANAPROX DS) 550 MG tablet     Sig: Take 1 tablet by mouth 2 times daily (with meals) for 14 doses     Dispense:  14 tablet     Refill:  0       CONSULTS:  None    MEDICAL DECISIONMAKING / ASSESSMENT / PLAN     Bobby Brown is a 74 y.o. male presents with a right knee pain after he twisted it 3 days ago. He has had previous surgery on this knee. Plain x-rays were unremarkable and duplex Doppler done given he was having pain in his posterior fossa  Was found to have a probable ruptured Baker's cyst but no DVT. At this point I placed a lidocaine patch in his upper calf area where he was having pain placed Ace wrap on his knee and then started him on Anaprox 550 mg 1 p.o. twice daily for 7 days. He was given off work the next 2 days and then asked to follow-up with his family physician return if any worsening symptoms. Clinical Impression     1. Strain of right knee, initial encounter    2. Baker's cyst of knee, right        Disposition     PATIENT REFERRED TO:  Nirmal Costa MD  [address]  [place] [address]  [phone]    Schedule an appointment as soon as possible for a visit in 2 days        DISCHARGE MEDICATIONS:  New Prescriptions    NAPROXEN SODIUM (ANAPROX DS) 550 MG TABLET    Take 1 tablet by mouth 2 times daily (with meals) for 14 doses       DISPOSITION discharge.        Ray Fajardo MD  07/27/21 5061

## 2021-12-16 ENCOUNTER — TELEPHONE (OUTPATIENT)
Dept: ORTHOPEDIC SURGERY | Age: 66
End: 2021-12-16

## 2021-12-16 NOTE — TELEPHONE ENCOUNTER
LVM for patient regarding the [address] [place] [address] Orthopedic joint pain program. Patient can call [phone] for more information or to schedule an appointment with a joint pain specialist.

## 2023-09-12 ENCOUNTER — HOSPITAL ENCOUNTER (EMERGENCY)
Age: 68
Discharge: LEFT BEFORE DISCHARGE | End: 2023-09-12
Attending: STUDENT IN AN ORGANIZED HEALTH CARE EDUCATION/TRAINING PROGRAM

## 2023-09-12 VITALS
HEART RATE: 71 BPM | WEIGHT: 174 LBS | BODY MASS INDEX: 24.36 KG/M2 | TEMPERATURE: 97.5 F | SYSTOLIC BLOOD PRESSURE: 145 MMHG | DIASTOLIC BLOOD PRESSURE: 89 MMHG | HEIGHT: 71 IN | OXYGEN SATURATION: 98 % | RESPIRATION RATE: 20 BRPM

## 2023-09-12 NOTE — ED TRIAGE NOTES
Patient presents to ED with complaints of left inguinal hernia pain. Patient reports that he has been seen for this before and was told unless its causing an issue, there is no reason to do anything about it but patient reports worsening pain recently.

## 2024-01-31 ENCOUNTER — APPOINTMENT (OUTPATIENT)
Dept: VASCULAR LAB | Age: 69
DRG: 176 | End: 2024-01-31
Payer: MEDICARE

## 2024-01-31 ENCOUNTER — APPOINTMENT (OUTPATIENT)
Dept: CT IMAGING | Age: 69
DRG: 176 | End: 2024-01-31
Payer: MEDICARE

## 2024-01-31 ENCOUNTER — APPOINTMENT (OUTPATIENT)
Dept: GENERAL RADIOLOGY | Age: 69
DRG: 176 | End: 2024-01-31
Payer: MEDICARE

## 2024-01-31 ENCOUNTER — HOSPITAL ENCOUNTER (INPATIENT)
Age: 69
LOS: 1 days | Discharge: HOME | DRG: 176 | End: 2024-02-01
Attending: STUDENT IN AN ORGANIZED HEALTH CARE EDUCATION/TRAINING PROGRAM | Admitting: SURGERY
Payer: MEDICARE

## 2024-01-31 DIAGNOSIS — J18.9 PNEUMONIA OF RIGHT LOWER LOBE DUE TO INFECTIOUS ORGANISM: ICD-10-CM

## 2024-01-31 DIAGNOSIS — I26.94 MULTIPLE SUBSEGMENTAL PULMONARY EMBOLI WITHOUT ACUTE COR PULMONALE (HCC): Primary | ICD-10-CM

## 2024-01-31 PROBLEM — R06.00 DYSPNEA: Status: ACTIVE | Noted: 2024-01-31

## 2024-01-31 LAB
ALBUMIN SERPL-MCNC: 3.9 G/DL (ref 3.4–5)
ALBUMIN/GLOB SERPL: 1.1 {RATIO} (ref 1.1–2.2)
ALP SERPL-CCNC: 95 U/L (ref 40–129)
ALT SERPL-CCNC: 11 U/L (ref 10–40)
ANION GAP SERPL CALCULATED.3IONS-SCNC: 9 MMOL/L (ref 3–16)
ANTI-XA UNFRAC HEPARIN: 0.87 IU/ML (ref 0.3–0.7)
ANTI-XA UNFRAC HEPARIN: <0.1 IU/ML (ref 0.3–0.7)
APTT BLD: 39.1 SEC (ref 22.7–35.9)
AST SERPL-CCNC: 20 U/L (ref 15–37)
BASOPHILS # BLD: 0 K/UL (ref 0–0.2)
BASOPHILS NFR BLD: 0.1 %
BILIRUB SERPL-MCNC: 0.4 MG/DL (ref 0–1)
BUN SERPL-MCNC: 13 MG/DL (ref 7–20)
CALCIUM SERPL-MCNC: 9.4 MG/DL (ref 8.3–10.6)
CHLORIDE SERPL-SCNC: 107 MMOL/L (ref 99–110)
CO2 SERPL-SCNC: 24 MMOL/L (ref 21–32)
CREAT SERPL-MCNC: 1.2 MG/DL (ref 0.8–1.3)
DEPRECATED RDW RBC AUTO: 13.7 % (ref 12.4–15.4)
DEPRECATED RDW RBC AUTO: 14.1 % (ref 12.4–15.4)
EOSINOPHIL # BLD: 0 K/UL (ref 0–0.6)
EOSINOPHIL NFR BLD: 0.8 %
FLUAV RNA RESP QL NAA+PROBE: NOT DETECTED
FLUBV RNA RESP QL NAA+PROBE: NOT DETECTED
GFR SERPLBLD CREATININE-BSD FMLA CKD-EPI: >60 ML/MIN/{1.73_M2}
GLUCOSE SERPL-MCNC: 110 MG/DL (ref 70–99)
HCT VFR BLD AUTO: 31.4 % (ref 40.5–52.5)
HCT VFR BLD AUTO: 33.6 % (ref 40.5–52.5)
HGB BLD-MCNC: 10.5 G/DL (ref 13.5–17.5)
HGB BLD-MCNC: 11.1 G/DL (ref 13.5–17.5)
INR PPP: 1.31 (ref 0.84–1.16)
LIPASE SERPL-CCNC: 16 U/L (ref 13–60)
LYMPHOCYTES # BLD: 0.8 K/UL (ref 1–5.1)
LYMPHOCYTES NFR BLD: 13.5 %
MCH RBC QN AUTO: 33.4 PG (ref 26–34)
MCH RBC QN AUTO: 33.6 PG (ref 26–34)
MCHC RBC AUTO-ENTMCNC: 33.2 G/DL (ref 31–36)
MCHC RBC AUTO-ENTMCNC: 33.5 G/DL (ref 31–36)
MCV RBC AUTO: 100.4 FL (ref 80–100)
MCV RBC AUTO: 100.6 FL (ref 80–100)
MONOCYTES # BLD: 0.8 K/UL (ref 0–1.3)
MONOCYTES NFR BLD: 12.9 %
NEUTROPHILS # BLD: 4.6 K/UL (ref 1.7–7.7)
NEUTROPHILS NFR BLD: 72.7 %
NT-PROBNP SERPL-MCNC: 116 PG/ML (ref 0–124)
PLATELET # BLD AUTO: 107 K/UL (ref 135–450)
PLATELET # BLD AUTO: 126 K/UL (ref 135–450)
PMV BLD AUTO: 9.2 FL (ref 5–10.5)
PMV BLD AUTO: 9.4 FL (ref 5–10.5)
POTASSIUM SERPL-SCNC: 4.1 MMOL/L (ref 3.5–5.1)
PROCALCITONIN SERPL IA-MCNC: 0.06 NG/ML (ref 0–0.15)
PROT SERPL-MCNC: 7.3 G/DL (ref 6.4–8.2)
PROTHROMBIN TIME: 16.3 SEC (ref 11.5–14.8)
RBC # BLD AUTO: 3.12 M/UL (ref 4.2–5.9)
RBC # BLD AUTO: 3.34 M/UL (ref 4.2–5.9)
SARS-COV-2 RNA RESP QL NAA+PROBE: NOT DETECTED
SODIUM SERPL-SCNC: 140 MMOL/L (ref 136–145)
TROPONIN, HIGH SENSITIVITY: 8 NG/L (ref 0–22)
TROPONIN, HIGH SENSITIVITY: 8 NG/L (ref 0–22)
WBC # BLD AUTO: 5.3 K/UL (ref 4–11)
WBC # BLD AUTO: 6.3 K/UL (ref 4–11)

## 2024-01-31 PROCEDURE — 99285 EMERGENCY DEPT VISIT HI MDM: CPT

## 2024-01-31 PROCEDURE — 6360000002 HC RX W HCPCS: Performed by: PHYSICIAN ASSISTANT

## 2024-01-31 PROCEDURE — 96374 THER/PROPH/DIAG INJ IV PUSH: CPT

## 2024-01-31 PROCEDURE — 85610 PROTHROMBIN TIME: CPT

## 2024-01-31 PROCEDURE — G0378 HOSPITAL OBSERVATION PER HR: HCPCS

## 2024-01-31 PROCEDURE — 84145 PROCALCITONIN (PCT): CPT

## 2024-01-31 PROCEDURE — 74177 CT ABD & PELVIS W/CONTRAST: CPT

## 2024-01-31 PROCEDURE — 96365 THER/PROPH/DIAG IV INF INIT: CPT

## 2024-01-31 PROCEDURE — 96368 THER/DIAG CONCURRENT INF: CPT

## 2024-01-31 PROCEDURE — 36415 COLL VENOUS BLD VENIPUNCTURE: CPT

## 2024-01-31 PROCEDURE — 85027 COMPLETE CBC AUTOMATED: CPT

## 2024-01-31 PROCEDURE — 96366 THER/PROPH/DIAG IV INF ADDON: CPT

## 2024-01-31 PROCEDURE — 6370000000 HC RX 637 (ALT 250 FOR IP)

## 2024-01-31 PROCEDURE — 83690 ASSAY OF LIPASE: CPT

## 2024-01-31 PROCEDURE — 85520 HEPARIN ASSAY: CPT

## 2024-01-31 PROCEDURE — 6360000004 HC RX CONTRAST MEDICATION: Performed by: PHYSICIAN ASSISTANT

## 2024-01-31 PROCEDURE — 84484 ASSAY OF TROPONIN QUANT: CPT

## 2024-01-31 PROCEDURE — 80053 COMPREHEN METABOLIC PANEL: CPT

## 2024-01-31 PROCEDURE — 93970 EXTREMITY STUDY: CPT

## 2024-01-31 PROCEDURE — 71046 X-RAY EXAM CHEST 2 VIEWS: CPT

## 2024-01-31 PROCEDURE — 83880 ASSAY OF NATRIURETIC PEPTIDE: CPT

## 2024-01-31 PROCEDURE — 96375 TX/PRO/DX INJ NEW DRUG ADDON: CPT

## 2024-01-31 PROCEDURE — 93005 ELECTROCARDIOGRAM TRACING: CPT

## 2024-01-31 PROCEDURE — 87636 SARSCOV2 & INF A&B AMP PRB: CPT

## 2024-01-31 PROCEDURE — 71260 CT THORAX DX C+: CPT

## 2024-01-31 PROCEDURE — 6370000000 HC RX 637 (ALT 250 FOR IP): Performed by: PHYSICIAN ASSISTANT

## 2024-01-31 PROCEDURE — 2580000003 HC RX 258: Performed by: PHYSICIAN ASSISTANT

## 2024-01-31 PROCEDURE — 85025 COMPLETE CBC W/AUTO DIFF WBC: CPT

## 2024-01-31 PROCEDURE — 85730 THROMBOPLASTIN TIME PARTIAL: CPT

## 2024-01-31 PROCEDURE — 1200000000 HC SEMI PRIVATE

## 2024-01-31 RX ORDER — ACETAMINOPHEN 325 MG/1
650 TABLET ORAL EVERY 6 HOURS PRN
Status: DISCONTINUED | OUTPATIENT
Start: 2024-01-31 | End: 2024-02-01 | Stop reason: HOSPADM

## 2024-01-31 RX ORDER — IPRATROPIUM BROMIDE AND ALBUTEROL SULFATE 2.5; .5 MG/3ML; MG/3ML
1 SOLUTION RESPIRATORY (INHALATION) EVERY 4 HOURS PRN
Status: DISCONTINUED | OUTPATIENT
Start: 2024-01-31 | End: 2024-02-01 | Stop reason: HOSPADM

## 2024-01-31 RX ORDER — SODIUM CHLORIDE 0.9 % (FLUSH) 0.9 %
5-40 SYRINGE (ML) INJECTION EVERY 12 HOURS SCHEDULED
Status: DISCONTINUED | OUTPATIENT
Start: 2024-01-31 | End: 2024-02-01 | Stop reason: HOSPADM

## 2024-01-31 RX ORDER — LITHIUM CARBONATE 300 MG/1
300 TABLET, FILM COATED, EXTENDED RELEASE ORAL 2 TIMES DAILY
Status: DISCONTINUED | OUTPATIENT
Start: 2024-01-31 | End: 2024-02-01

## 2024-01-31 RX ORDER — QUETIAPINE FUMARATE 300 MG/1
300 TABLET, FILM COATED ORAL NIGHTLY
Status: DISCONTINUED | OUTPATIENT
Start: 2024-01-31 | End: 2024-02-01

## 2024-01-31 RX ORDER — HYDROCODONE BITARTRATE AND ACETAMINOPHEN 5; 325 MG/1; MG/1
1 TABLET ORAL EVERY 4 HOURS PRN
Status: DISCONTINUED | OUTPATIENT
Start: 2024-01-31 | End: 2024-02-01 | Stop reason: HOSPADM

## 2024-01-31 RX ORDER — BENZONATATE 100 MG/1
200 CAPSULE ORAL ONCE
Status: COMPLETED | OUTPATIENT
Start: 2024-01-31 | End: 2024-01-31

## 2024-01-31 RX ORDER — DIAZEPAM 10 MG/1
10 TABLET ORAL 3 TIMES DAILY
Status: DISCONTINUED | OUTPATIENT
Start: 2024-01-31 | End: 2024-02-01

## 2024-01-31 RX ORDER — DOCUSATE SODIUM 100 MG/1
100 CAPSULE, LIQUID FILLED ORAL 2 TIMES DAILY
Status: DISCONTINUED | OUTPATIENT
Start: 2024-01-31 | End: 2024-02-01 | Stop reason: HOSPADM

## 2024-01-31 RX ORDER — HEPARIN SODIUM 1000 [USP'U]/ML
80 INJECTION, SOLUTION INTRAVENOUS; SUBCUTANEOUS PRN
Status: DISCONTINUED | OUTPATIENT
Start: 2024-01-31 | End: 2024-02-01 | Stop reason: HOSPADM

## 2024-01-31 RX ORDER — HEPARIN SODIUM 10000 [USP'U]/100ML
5-30 INJECTION, SOLUTION INTRAVENOUS CONTINUOUS
Status: DISCONTINUED | OUTPATIENT
Start: 2024-01-31 | End: 2024-02-01

## 2024-01-31 RX ORDER — HYDROCODONE BITARTRATE AND ACETAMINOPHEN 5; 325 MG/1; MG/1
2 TABLET ORAL EVERY 4 HOURS PRN
Status: DISCONTINUED | OUTPATIENT
Start: 2024-01-31 | End: 2024-02-01 | Stop reason: HOSPADM

## 2024-01-31 RX ORDER — ALBUTEROL SULFATE 2.5 MG/3ML
2.5 SOLUTION RESPIRATORY (INHALATION) EVERY 6 HOURS PRN
Status: DISCONTINUED | OUTPATIENT
Start: 2024-01-31 | End: 2024-02-01 | Stop reason: HOSPADM

## 2024-01-31 RX ORDER — HEPARIN SODIUM 1000 [USP'U]/ML
80 INJECTION, SOLUTION INTRAVENOUS; SUBCUTANEOUS ONCE
Status: COMPLETED | OUTPATIENT
Start: 2024-01-31 | End: 2024-01-31

## 2024-01-31 RX ORDER — ONDANSETRON 2 MG/ML
4 INJECTION INTRAMUSCULAR; INTRAVENOUS EVERY 6 HOURS PRN
Status: DISCONTINUED | OUTPATIENT
Start: 2024-01-31 | End: 2024-02-01 | Stop reason: HOSPADM

## 2024-01-31 RX ORDER — HYDROMORPHONE HYDROCHLORIDE 1 MG/ML
1 INJECTION, SOLUTION INTRAMUSCULAR; INTRAVENOUS; SUBCUTANEOUS ONCE
Status: DISCONTINUED | OUTPATIENT
Start: 2024-01-31 | End: 2024-01-31

## 2024-01-31 RX ORDER — ONDANSETRON 4 MG/1
4 TABLET, ORALLY DISINTEGRATING ORAL EVERY 8 HOURS PRN
Status: DISCONTINUED | OUTPATIENT
Start: 2024-01-31 | End: 2024-02-01 | Stop reason: HOSPADM

## 2024-01-31 RX ORDER — SODIUM CHLORIDE 9 MG/ML
INJECTION, SOLUTION INTRAVENOUS PRN
Status: DISCONTINUED | OUTPATIENT
Start: 2024-01-31 | End: 2024-02-01 | Stop reason: HOSPADM

## 2024-01-31 RX ORDER — POLYETHYLENE GLYCOL 3350 17 G/17G
17 POWDER, FOR SOLUTION ORAL DAILY PRN
Status: DISCONTINUED | OUTPATIENT
Start: 2024-01-31 | End: 2024-02-01 | Stop reason: HOSPADM

## 2024-01-31 RX ORDER — HEPARIN SODIUM 1000 [USP'U]/ML
40 INJECTION, SOLUTION INTRAVENOUS; SUBCUTANEOUS PRN
Status: DISCONTINUED | OUTPATIENT
Start: 2024-01-31 | End: 2024-02-01 | Stop reason: HOSPADM

## 2024-01-31 RX ORDER — KETOROLAC TROMETHAMINE 30 MG/ML
15 INJECTION, SOLUTION INTRAMUSCULAR; INTRAVENOUS ONCE
Status: COMPLETED | OUTPATIENT
Start: 2024-01-31 | End: 2024-01-31

## 2024-01-31 RX ORDER — HYDROMORPHONE HYDROCHLORIDE 1 MG/ML
0.5 INJECTION, SOLUTION INTRAMUSCULAR; INTRAVENOUS; SUBCUTANEOUS ONCE
Status: COMPLETED | OUTPATIENT
Start: 2024-01-31 | End: 2024-01-31

## 2024-01-31 RX ORDER — SODIUM CHLORIDE 0.9 % (FLUSH) 0.9 %
5-40 SYRINGE (ML) INJECTION PRN
Status: DISCONTINUED | OUTPATIENT
Start: 2024-01-31 | End: 2024-02-01 | Stop reason: HOSPADM

## 2024-01-31 RX ORDER — ACETAMINOPHEN 650 MG/1
650 SUPPOSITORY RECTAL EVERY 6 HOURS PRN
Status: DISCONTINUED | OUTPATIENT
Start: 2024-01-31 | End: 2024-02-01 | Stop reason: HOSPADM

## 2024-01-31 RX ADMIN — HEPARIN SODIUM 18 UNITS/KG/HR: 10000 INJECTION, SOLUTION INTRAVENOUS at 14:34

## 2024-01-31 RX ADMIN — HYDROCODONE BITARTRATE AND ACETAMINOPHEN 2 TABLET: 5; 325 TABLET ORAL at 20:05

## 2024-01-31 RX ADMIN — BENZONATATE 200 MG: 100 CAPSULE ORAL at 14:35

## 2024-01-31 RX ADMIN — KETOROLAC TROMETHAMINE 15 MG: 30 INJECTION, SOLUTION INTRAMUSCULAR; INTRAVENOUS at 12:39

## 2024-01-31 RX ADMIN — AZITHROMYCIN MONOHYDRATE 500 MG: 500 INJECTION, POWDER, LYOPHILIZED, FOR SOLUTION INTRAVENOUS at 15:27

## 2024-01-31 RX ADMIN — HYDROMORPHONE HYDROCHLORIDE 0.5 MG: 1 INJECTION, SOLUTION INTRAMUSCULAR; INTRAVENOUS; SUBCUTANEOUS at 14:38

## 2024-01-31 RX ADMIN — CEFTRIAXONE 1000 MG: 1 INJECTION, POWDER, FOR SOLUTION INTRAMUSCULAR; INTRAVENOUS at 14:41

## 2024-01-31 RX ADMIN — HEPARIN SODIUM 7000 UNITS: 1000 INJECTION INTRAVENOUS; SUBCUTANEOUS at 14:32

## 2024-01-31 RX ADMIN — IOPAMIDOL 75 ML: 755 INJECTION, SOLUTION INTRAVENOUS at 13:14

## 2024-01-31 ASSESSMENT — PAIN SCALES - GENERAL
PAINLEVEL_OUTOF10: 8

## 2024-01-31 ASSESSMENT — ENCOUNTER SYMPTOMS
ABDOMINAL PAIN: 1
BACK PAIN: 1
SHORTNESS OF BREATH: 1
NAUSEA: 0
BACK PAIN: 0
DIARRHEA: 0
CHEST TIGHTNESS: 0
VOMITING: 0
CONSTIPATION: 0
ABDOMINAL PAIN: 0
SORE THROAT: 0
RHINORRHEA: 0
COUGH: 1

## 2024-01-31 ASSESSMENT — PAIN DESCRIPTION - LOCATION: LOCATION: FLANK;RIB CAGE

## 2024-01-31 ASSESSMENT — PAIN DESCRIPTION - DESCRIPTORS: DESCRIPTORS: DISCOMFORT

## 2024-01-31 ASSESSMENT — PAIN DESCRIPTION - ORIENTATION: ORIENTATION: RIGHT

## 2024-01-31 ASSESSMENT — PAIN - FUNCTIONAL ASSESSMENT: PAIN_FUNCTIONAL_ASSESSMENT: 0-10

## 2024-01-31 NOTE — H&P
Blood Pressure Sister     Diabetes Brother        Review of Systems   Constitutional:  Negative for activity change, appetite change, chills, diaphoresis, fatigue, fever and unexpected weight change.   HENT:  Negative for congestion, postnasal drip, rhinorrhea and sore throat.    Eyes:  Negative for visual disturbance.   Respiratory:  Positive for cough and shortness of breath. Negative for chest tightness.         Right-sided rib pain   Cardiovascular:  Negative for chest pain, palpitations and leg swelling.   Gastrointestinal:  Negative for abdominal pain, constipation, diarrhea, nausea and vomiting.   Genitourinary:  Negative for difficulty urinating, dysuria, frequency and urgency.   Musculoskeletal:  Positive for back pain (chronic).   Neurological:  Negative for dizziness, light-headedness and headaches.       ROS: A 10 point review of systems was conducted, significant findings as noted in HPI.    Physical Exam  Constitutional:       General: He is awake. He is not in acute distress.     Appearance: He is not ill-appearing or diaphoretic.   HENT:      Head: Normocephalic and atraumatic.      Mouth/Throat:      Mouth: Mucous membranes are moist.   Eyes:      General: No scleral icterus.     Extraocular Movements: Extraocular movements intact.   Cardiovascular:      Rate and Rhythm: Normal rate and regular rhythm.      Heart sounds: Normal heart sounds.   Pulmonary:      Effort: Pulmonary effort is normal.      Breath sounds: Examination of the right-lower field reveals rales. Examination of the left-lower field reveals rales. Rales present.   Chest:      Chest wall: Tenderness (right ribs; mild) present.   Abdominal:      General: Abdomen is protuberant. Bowel sounds are normal.      Palpations: Abdomen is soft.      Tenderness: There is no abdominal tenderness.   Musculoskeletal:      Cervical back: Full passive range of motion without pain.      Right lower leg: No edema.      Left lower leg: No edema.    Neurological:      Mental Status: He is alert and oriented to person, place, and time.      Cranial Nerves: Cranial nerves 2-12 are intact.   Psychiatric:         Behavior: Behavior is cooperative.       Physical exam:       Vitals:    01/31/24 1549   BP: (!) 149/83   Pulse: (!) 111   Resp: 28   Temp:    SpO2: 100%       DATA:    Labs:  CBC:   Recent Labs     01/31/24  1216   WBC 6.3   HGB 11.1*   HCT 33.6*   *       BMP:   Recent Labs     01/31/24  1216      K 4.1      CO2 24   BUN 13   CREATININE 1.2   GLUCOSE 110*     LFT's:   Recent Labs     01/31/24  1216   AST 20   ALT 11   BILITOT 0.4   ALKPHOS 95     Troponin: No results for input(s): \"TROPONINI\" in the last 72 hours.  BNP:No results for input(s): \"BNP\" in the last 72 hours.  ABGs: No results for input(s): \"PHART\", \"GNV1OPA\", \"PO2ART\" in the last 72 hours.  INR:   Recent Labs     01/31/24  1416   INR 1.31*       U/A:No results for input(s): \"NITRITE\", \"COLORU\", \"PHUR\", \"LABCAST\", \"WBCUA\", \"RBCUA\", \"MUCUS\", \"TRICHOMONAS\", \"YEAST\", \"BACTERIA\", \"CLARITYU\", \"SPECGRAV\", \"LEUKOCYTESUR\", \"UROBILINOGEN\", \"BILIRUBINUR\", \"BLOODU\", \"GLUCOSEU\", \"AMORPHOUS\" in the last 72 hours.    Invalid input(s): \"KETONESU\"    CT ABDOMEN PELVIS W IV CONTRAST Additional Contrast? None   Final Result   Right lower lobe segmental and subsegmental pulmonary emboli. No CT   sequelae of right heart strain.      Groundglass consolidation right lower lobe.      Debris noted within bilateral lower lobe bronchi      Morphologic findings of cirrhosis..      Aneurysmal dilation of the right common iliac artery to 2.4 cm, increased mildly   from the prior study.      CT CHEST PULMONARY EMBOLISM W CONTRAST   Final Result   Right lower lobe segmental and subsegmental pulmonary emboli. No CT   sequelae of right heart strain.      Groundglass consolidation right lower lobe.      Debris noted within bilateral lower lobe bronchi      Morphologic findings of cirrhosis..

## 2024-01-31 NOTE — ED PROVIDER NOTES
ED Attending Attestation Note     Date of evaluation: 1/31/2024    This patient was seen by the advance practice provider.  I have seen and examined the patient, agree with the workup, evaluation, management and diagnosis. The care plan has been discussed. My assessment reveals a 68-year-old male with history of tobacco use and hyperlipidemia who is presenting with atraumatic right flank/lower abdominal pain that worsens with movement and coughing.  Pain started last night with no known trigger.  Patient is mildly hypertensive on arrival at 142/74 but otherwise has normal vital signs.  He is overall well-appearing but does have reproducible focal tenderness in the right flank and right mid/lower abdomen.  He has not had any urinary symptoms or testicular pain.  He is moving all 4 extremities and ambulates with a normal gait.  He has equal pulses in all 4 extremities.  There is no abdominal bruit.    Gen: Alert, awake, non toxic appearing   Head: NCAT  Eyes: PERRL, EOMI  Neck: Supple, full ROM  Cardiac: RRR, no murmurs, rubs or gallops  Lungs: No respiratory distress, lungs CTAB  Abd: Soft, non distended, no bruit, TTP in the right flank/CVA  Extremities: No deformities, warm and well perfused, strong peripheral pulses  Neuro: A&Ox3, moving all extremities equally, no focal deficits  Psych: Appropriate mood and affect    I reviewed EKG and this shows normal sinus rhythm at 76 bpm with first-degree AV block with .  QRS slightly wide at 104.  QTc 416.  Isolated T wave inversion in lead III.  No ST elevations or depressions.    Patient was found to have a suspected pneumonia and segmental/subsegmental pulmonary embolism without right heart strain.  Will start on heparin drip, antibiotics and plan to admit.    Critical Care:  Due to the immediate potential for life-threatening deterioration due to acute pulmonary embolism and pneumonia, I spent 38 minutes providing critical care.  This time excludes time spent

## 2024-01-31 NOTE — ED PROVIDER NOTES
THE [facility]  EMERGENCY DEPARTMENT ENCOUNTER          PHYSICIAN ASSISTANT NOTE       Date of evaluation: 1/31/2024    Chief Complaint     Rib Pain (Patient comes to the ED today for R sided rib pain that started last night while laying in bed. Patient denies any injury or strenous activity. Patient states that this came on all of a sudden. Patient denies any urinary symptoms. Patient is having pain on inspiration. ) and Flank Pain    History of Present Illness     Andrea Eubanks is a 68 y.o. male with a past medical history of bipolar disorder, COPD, right common iliac artery aneurysm, BPH, cirrhosis of the liver, hyperlipidemia, chronic pain, GERD, frequent falls who presents to the emergency department with a chief complaint of right-sided rib pain that started last night while laying in bed.  Patient did not sleep at all due to the discomfort.  He does describe a slight pleuritic type component to his pain.  He denies exertional pain.  He states that he had not been doing anything strenuous last night before it occurred he was simply just laying in bed.  He has never had a pain like this before.  He is never had any injuries to his low back.  States that the pain starts in his right flank and radiates around his right side into his right-sided abdomen.  He denies prior history of PE/DVT, not on anticoagulation.  No recent travel or surgeries.  No sick contacts that the patient is aware of.  He does tell me that he has had a productive cough for the past several weeks.  He specifically denies any urinary symptoms such as frequency, hematuria or dysuria.  The patient does smoke 1 pack every 3 days.    ASSESSMENT / PLAN  (MEDICAL DECISION MAKING)     INITIAL VITALS: BP: (!) 143/74, Temp: 98.1 °F (36.7 °C), Pulse: 80, Respirations: 16, SpO2: 99 %    Andrea Eubanks is a 68 y.o. male who presents emergency department with normal and stable vital signs with a chief complaint of right-sided rib/flank  History     He has a past medical history of Anxiety, Bipolar affective disorder (HCC), Chronic back pain, Chronic pain syndrome, Constipation, Depression, Frequent falls, GERD (gastroesophageal reflux disease), Insomnia, Osteoarthritis, Reactive airway disease, and Unsteady gait.  He has a past surgical history that includes Colonoscopy; Colonoscopy; Colonoscopy; Colonoscopy (2012); Upper gastrointestinal endoscopy (2007); Total knee arthroplasty (Left, 2006); Total hip arthroplasty (Right, 2007 - 2008); Rotator cuff repair (Right, 2008 - 2010); and Cholecystectomy, laparoscopic (06/01/2018).  His family history includes Diabetes in his brother and father; Heart Disease in his sister; High Blood Pressure in his father, mother, and sister; Stroke in his father.  He reports that he has been smoking cigarettes. He has never used smokeless tobacco. He reports current alcohol use. He reports that he does not use drugs.    Medications     Previous Medications    ALBUTEROL (PROAIR HFA) 108 (90 BASE) MCG/ACT INHALER    Inhale 2 puffs into the lungs every 6 hours as needed for Wheezing or Shortness of Breath.    CALCIUM CARBONATE-VITAMIN D (CALCIUM 500/D) 500-125 MG-UNIT TABS    Take by mouth    DIAZEPAM (VALIUM) 10 MG TABLET    Take 1 tablet by mouth 3 times daily    DOCUSATE SODIUM (COLACE) 100 MG CAPSULE    Take 1 capsule by mouth 2 times daily.    LIDOCAINE (LIDODERM) 5 %    Place 1 patch onto the skin daily 12 hours on, 12 hours off.    LITHIUM (LITHOBID) 300 MG CR TABLET    Take 2 tablets by mouth 2 times daily    NAPROXEN SODIUM (ANAPROX DS) 550 MG TABLET    Take 1 tablet by mouth 2 times daily (with meals) for 14 doses    PSYLLIUM 63 % POWD    Take 7.5 mLs by mouth daily.    QUETIAPINE (SEROQUEL) 300 MG TABLET    1-2 tablets nightly as tolerated    TERAZOSIN (HYTRIN) 2 MG CAPSULE    Take 1 capsule by mouth nightly.       Allergies     He is allergic to morphine.    Physical Exam     INITIAL VITALS: BP: (!) 143/74,

## 2024-02-01 VITALS
RESPIRATION RATE: 20 BRPM | WEIGHT: 195.11 LBS | HEIGHT: 71 IN | SYSTOLIC BLOOD PRESSURE: 178 MMHG | BODY MASS INDEX: 27.31 KG/M2 | OXYGEN SATURATION: 93 % | TEMPERATURE: 97.5 F | HEART RATE: 93 BPM | DIASTOLIC BLOOD PRESSURE: 80 MMHG

## 2024-02-01 LAB
ALBUMIN SERPL-MCNC: 3.6 G/DL (ref 3.4–5)
ANION GAP SERPL CALCULATED.3IONS-SCNC: 9 MMOL/L (ref 3–16)
ANTI-XA UNFRAC HEPARIN: 0.53 IU/ML (ref 0.3–0.7)
BASOPHILS # BLD: 0 K/UL (ref 0–0.2)
BASOPHILS NFR BLD: 0.2 %
BUN SERPL-MCNC: 17 MG/DL (ref 7–20)
CALCIUM SERPL-MCNC: 9 MG/DL (ref 8.3–10.6)
CHLORIDE SERPL-SCNC: 105 MMOL/L (ref 99–110)
CO2 SERPL-SCNC: 25 MMOL/L (ref 21–32)
CREAT SERPL-MCNC: 1.3 MG/DL (ref 0.8–1.3)
DEPRECATED RDW RBC AUTO: 14.1 % (ref 12.4–15.4)
EOSINOPHIL # BLD: 0.1 K/UL (ref 0–0.6)
EOSINOPHIL NFR BLD: 1.6 %
GFR SERPLBLD CREATININE-BSD FMLA CKD-EPI: 59 ML/MIN/{1.73_M2}
GLUCOSE SERPL-MCNC: 99 MG/DL (ref 70–99)
HCT VFR BLD AUTO: 32.1 % (ref 40.5–52.5)
HGB BLD-MCNC: 10.7 G/DL (ref 13.5–17.5)
LYMPHOCYTES # BLD: 0.9 K/UL (ref 1–5.1)
LYMPHOCYTES NFR BLD: 19.4 %
MCH RBC QN AUTO: 33.4 PG (ref 26–34)
MCHC RBC AUTO-ENTMCNC: 33.2 G/DL (ref 31–36)
MCV RBC AUTO: 100.8 FL (ref 80–100)
MONOCYTES # BLD: 0.6 K/UL (ref 0–1.3)
MONOCYTES NFR BLD: 12.7 %
NEUTROPHILS # BLD: 3.2 K/UL (ref 1.7–7.7)
NEUTROPHILS NFR BLD: 66.1 %
PHOSPHATE SERPL-MCNC: 2.8 MG/DL (ref 2.5–4.9)
PLATELET # BLD AUTO: 106 K/UL (ref 135–450)
PMV BLD AUTO: 9.2 FL (ref 5–10.5)
POTASSIUM SERPL-SCNC: 4 MMOL/L (ref 3.5–5.1)
RBC # BLD AUTO: 3.19 M/UL (ref 4.2–5.9)
SODIUM SERPL-SCNC: 139 MMOL/L (ref 136–145)
WBC # BLD AUTO: 4.8 K/UL (ref 4–11)

## 2024-02-01 PROCEDURE — 93306 TTE W/DOPPLER COMPLETE: CPT

## 2024-02-01 PROCEDURE — 6370000000 HC RX 637 (ALT 250 FOR IP): Performed by: NURSE PRACTITIONER

## 2024-02-01 PROCEDURE — 85025 COMPLETE CBC W/AUTO DIFF WBC: CPT

## 2024-02-01 PROCEDURE — 36415 COLL VENOUS BLD VENIPUNCTURE: CPT

## 2024-02-01 PROCEDURE — 80069 RENAL FUNCTION PANEL: CPT

## 2024-02-01 PROCEDURE — G0378 HOSPITAL OBSERVATION PER HR: HCPCS

## 2024-02-01 PROCEDURE — 2580000003 HC RX 258: Performed by: STUDENT IN AN ORGANIZED HEALTH CARE EDUCATION/TRAINING PROGRAM

## 2024-02-01 PROCEDURE — 85520 HEPARIN ASSAY: CPT

## 2024-02-01 PROCEDURE — 6360000002 HC RX W HCPCS: Performed by: STUDENT IN AN ORGANIZED HEALTH CARE EDUCATION/TRAINING PROGRAM

## 2024-02-01 PROCEDURE — 6370000000 HC RX 637 (ALT 250 FOR IP)

## 2024-02-01 PROCEDURE — 6370000000 HC RX 637 (ALT 250 FOR IP): Performed by: SURGERY

## 2024-02-01 PROCEDURE — 96366 THER/PROPH/DIAG IV INF ADDON: CPT

## 2024-02-01 PROCEDURE — C8929 TTE W OR WO FOL WCON,DOPPLER: HCPCS

## 2024-02-01 RX ORDER — LANOLIN ALCOHOL/MO/W.PET/CERES
6 CREAM (GRAM) TOPICAL NIGHTLY
Status: ON HOLD | COMMUNITY
Start: 2023-06-26 | End: 2024-02-01 | Stop reason: DRUGHIGH

## 2024-02-01 RX ORDER — DIAZEPAM 10 MG/1
10 TABLET ORAL 2 TIMES DAILY
COMMUNITY

## 2024-02-01 RX ORDER — ATORVASTATIN CALCIUM 80 MG/1
80 TABLET, FILM COATED ORAL DAILY
COMMUNITY

## 2024-02-01 RX ORDER — QUETIAPINE FUMARATE 300 MG/1
900 TABLET, FILM COATED ORAL NIGHTLY
Status: DISCONTINUED | OUTPATIENT
Start: 2024-02-01 | End: 2024-02-01 | Stop reason: HOSPADM

## 2024-02-01 RX ORDER — QUETIAPINE FUMARATE 300 MG/1
900 TABLET, FILM COATED ORAL NIGHTLY
COMMUNITY

## 2024-02-01 RX ORDER — LITHIUM CARBONATE 300 MG
300 TABLET ORAL
Status: DISCONTINUED | OUTPATIENT
Start: 2024-02-02 | End: 2024-02-01 | Stop reason: HOSPADM

## 2024-02-01 RX ORDER — LITHIUM CARBONATE 300 MG/1
300 TABLET, FILM COATED, EXTENDED RELEASE ORAL
COMMUNITY

## 2024-02-01 RX ORDER — CALCIUM CARBONATE/VITAMIN D3 500MG-5MCG
1 TABLET ORAL 3 TIMES DAILY
Status: ON HOLD | COMMUNITY
Start: 2023-12-29 | End: 2024-02-01 | Stop reason: HOSPADM

## 2024-02-01 RX ORDER — LITHIUM CARBONATE 300 MG/1
600 TABLET, FILM COATED, EXTENDED RELEASE ORAL NIGHTLY
COMMUNITY

## 2024-02-01 RX ORDER — DIAZEPAM 10 MG/1
10 TABLET ORAL 2 TIMES DAILY
Status: DISCONTINUED | OUTPATIENT
Start: 2024-02-01 | End: 2024-02-01 | Stop reason: HOSPADM

## 2024-02-01 RX ORDER — SILDENAFIL 100 MG/1
100 TABLET, FILM COATED ORAL PRN
COMMUNITY

## 2024-02-01 RX ORDER — LITHIUM CARBONATE 300 MG
600 TABLET ORAL NIGHTLY
Status: DISCONTINUED | OUTPATIENT
Start: 2024-02-01 | End: 2024-02-01 | Stop reason: HOSPADM

## 2024-02-01 RX ADMIN — DIAZEPAM 10 MG: 10 TABLET ORAL at 00:05

## 2024-02-01 RX ADMIN — HEPARIN SODIUM 16 UNITS/KG/HR: 10000 INJECTION, SOLUTION INTRAVENOUS at 06:05

## 2024-02-01 RX ADMIN — APIXABAN 10 MG: 5 TABLET, FILM COATED ORAL at 12:53

## 2024-02-01 RX ADMIN — QUETIAPINE FUMARATE 300 MG: 300 TABLET ORAL at 00:06

## 2024-02-01 RX ADMIN — LITHIUM CARBONATE 300 MG: 300 TABLET, EXTENDED RELEASE ORAL at 00:12

## 2024-02-01 RX ADMIN — SODIUM CHLORIDE, PRESERVATIVE FREE 10 ML: 5 INJECTION INTRAVENOUS at 10:21

## 2024-02-01 RX ADMIN — DOCUSATE SODIUM 100 MG: 100 CAPSULE, LIQUID FILLED ORAL at 10:20

## 2024-02-01 RX ADMIN — LITHIUM CARBONATE 300 MG: 300 TABLET, EXTENDED RELEASE ORAL at 10:21

## 2024-02-01 RX ADMIN — HYDROCODONE BITARTRATE AND ACETAMINOPHEN 2 TABLET: 5; 325 TABLET ORAL at 05:24

## 2024-02-01 RX ADMIN — DIAZEPAM 10 MG: 10 TABLET ORAL at 10:21

## 2024-02-01 RX ADMIN — DOCUSATE SODIUM 100 MG: 100 CAPSULE, LIQUID FILLED ORAL at 00:05

## 2024-02-01 ASSESSMENT — PAIN DESCRIPTION - DESCRIPTORS: DESCRIPTORS: DISCOMFORT

## 2024-02-01 ASSESSMENT — PAIN DESCRIPTION - FREQUENCY: FREQUENCY: INTERMITTENT

## 2024-02-01 ASSESSMENT — PAIN SCALES - WONG BAKER
WONGBAKER_NUMERICALRESPONSE: 0

## 2024-02-01 ASSESSMENT — PAIN SCALES - GENERAL
PAINLEVEL_OUTOF10: 0
PAINLEVEL_OUTOF10: 8
PAINLEVEL_OUTOF10: 0

## 2024-02-01 ASSESSMENT — PAIN DESCRIPTION - PAIN TYPE: TYPE: ACUTE PAIN

## 2024-02-01 ASSESSMENT — PAIN DESCRIPTION - ONSET: ONSET: GRADUAL

## 2024-02-01 ASSESSMENT — PAIN - FUNCTIONAL ASSESSMENT: PAIN_FUNCTIONAL_ASSESSMENT: PREVENTS OR INTERFERES SOME ACTIVE ACTIVITIES AND ADLS

## 2024-02-01 ASSESSMENT — PAIN DESCRIPTION - ORIENTATION: ORIENTATION: RIGHT

## 2024-02-01 ASSESSMENT — PAIN DESCRIPTION - LOCATION: LOCATION: RIB CAGE

## 2024-02-01 NOTE — PROGRESS NOTES
Pt. Returned to unit. Vitals stable and pt. Has no complaints of pain. Pt.is very cold,but resting in bed. Heparin drip has been d/c'd by MD and pt. Has been given a dose of eliquis. Wife at bedside. Bedside RN explained d/c paperwork and educated pt. About eliquis, the side effects it can have, what to watch for, when to call a MD or 911, and to be very cautious of falls. Pt.s meds have been sent to pharmacy by MD and pt. Is awaiting them to be filled. Pt.s IV Xx2 has been removed with no complaints or complications (bleeding stopped.) Telemetry monitor removed.

## 2024-02-01 NOTE — PROGRESS NOTES
Pt. Taken to d/c pharmacy by bedside RN to  medications. Pt. Received medications and was taken to main lobby by bedside RN where his wife was awaiting to take him home. Pt. Was helped into the vehicle safely.

## 2024-02-01 NOTE — PROGRESS NOTES
Physician Progress Note      PATIENT:               ALIS GALINDO  [facility] #:                  [id number]  :                       1955  ADMIT DATE:       2024 11:41 AM  DISCH DATE:        2024 3:12 PM  RESPONDING  PROVIDER #:        Idris Broussard MD          QUERY TEXT:    Pt admitted with pulmonary embolism and underwent hernia repair about 2 months   ago.? If possible, please document in progress notes and discharge summary   the relationship if any between the pulmonary embolism and the hernia repair:    The medical record reflects the following:  Risk Factors: 67 yo w/ pulmonary embolism 2 months after a hernia repair  Clinical Indicators: Per H&P: Two months prior to admission, the patient   underwent a left inguinal hernia repair.  This is his most recent of a series   of 10 surgeries.  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  Treatment: CT, heparin gtt  Options provided:  -- Pulmonary embolism is due to the hernia repair  -- Pulmonary embolism is not due to the hernia repair  -- Other - I will add my own diagnosis  -- Disagree - Not applicable / Not valid  -- Disagree - Clinically unable to determine / Unknown  -- Refer to Clinical Documentation Reviewer    PROVIDER RESPONSE TEXT:    Pulmonary embolism is not due to the hernia repair.    Query created by: Hortencia Steinberg on 2024 3:19 PM      Electronically signed by:  Idris Broussard MD 2024 4:08 PM

## 2024-02-01 NOTE — PLAN OF CARE
Problem: Safety - Adult  Goal: Free from fall injury  Outcome: Progressing  Note:  Remains free from falls, standby assistance for safety.     Problem: Pain  Goal: Verbalizes/displays adequate comfort level or baseline comfort level  Outcome: Progressing  Note:  PRN Norco 5-325 mg, 2 PO at 0524 for 8/10 pain to ribcage area, will continue to monitor.

## 2024-02-01 NOTE — CONSULTS
Clinical Pharmacy Consult Note  Medication History     Admit Date: 1/31/2024    Pharmacy consulted to verify home medication list by Dr. Alonso.    List of of current medications patient is taking is complete. Home Medication list in EPIC updated to reflect changes noted below.    Source of information: Records from [facility]; requested faxed list of pt's medications     Patient's home pharmacy: [facility]     Changes made to medication list:   Medications removed: (include reason, ex: therapy completed, patient no longer taking, etc.)  Lidocaine patches - pt reported as not taking on admit  Naproxen - Rx from 2021  Psyllium - pt reports not taking on admission   Medications added:   Atorvastatin - last filled 10/21/23 x 90d supply  Systane eye drops  Melatonin 6 mg nightly - last filled 12/29/23  Terbinafine cream - last filled 12/29/23  Sildenafil - last filled 12/5/23 x 18 tabs  Vitamin E - last filled 10/25/23 x 100d supply  Medication doses adjusted:   Quetiapine-->900mg nightly; last filled 12/5/23 x 60d supply  Lithium-->300mg in AM / 600mg nightly; last filled 12/5/23 x 30d supply; unclear if pt compliant w/ med?  Calcium + vit D - last filled 12/29/23  Diazepam 10mg po BID - last filled 12/18/23 x 30d supply  Other notes:   Unclear if pt has Albuterol inhaler at home  - [facility]  Unclear if pt is still taking Terazosin - last filled for 4 mg po nightly on 10/25/23 for 90d supply. Pt reported not taking on admit    Current Outpatient Medications   Medication Instructions    albuterol (PROAIR HFA) 108 (90 BASE) MCG/ACT inhaler 2 puffs, Inhalation, EVERY 6 HOURS PRN    apixaban (ELIQUIS) 5 MG TABS tablet Take 2 tablets by mouth twice daily for 7 days then take 1 tablet by mouth twice daily thereafter.    atorvastatin (LIPITOR) 80 mg, Oral, DAILY    diazePAM (VALIUM) 10 mg, Oral, 2 TIMES DAILY    docusate sodium (COLACE) 100 mg, Oral, 2 TIMES DAILY    lithium (LITHOBID) 300 mg, Oral, DAILY

## 2024-02-01 NOTE — PROGRESS NOTES
4 Eyes Admission Assessment     I agree as the admission nurse that 2 RN's have performed a thorough Head to Toe Skin Assessment on the patient. ALL assessment sites listed below have been assessed on admission.       Areas assessed by both nurses: Marlen Patel and Felipe Kidd, RNs  [x]   Head, Face, and Ears   [x]   Shoulders, Back, and Chest  [x]   Arms, Elbows, and Hands   [x]   Coccyx, Sacrum, and Ischium  [x]   Legs, Feet, and Heels        Does the Patient have Skin Breakdown?  No         Cory Prevention initiated:  Yes   Wound Care Orders initiated:  NA      [facility] nurse consulted for Pressure Injury (Stage 3,4, Unstageable, DTI, NWPT, and Complex wounds) or Cory score 18 or lower:  NA      Nurse 1 eSignature: Electronically signed by Marlen Patel RN on 2/1/24 at 5:32 AM EST    **SHARE this note so that the co-signing nurse is able to place an eSignature**    Nurse 2 eSignature: Electronically signed by eFlipe Kidd RN on 2/1/24 at 5:45 AM EST

## 2024-02-01 NOTE — PLAN OF CARE
Problem: Discharge Planning  Goal: Discharge to home or other facility with appropriate resources  Outcome: Adequate for Discharge     Problem: Pain  Goal: Verbalizes/displays adequate comfort level or baseline comfort level  2/1/2024 1305 by Tay Jenkins RN  Outcome: Adequate for Discharge  2/1/2024 0916 by Marlen Patel RN  Outcome: Progressing     Problem: Safety - Adult  Goal: Free from fall injury  2/1/2024 1305 by Tay Jenkins RN  Outcome: Adequate for Discharge  2/1/2024 0916 by Marlen Patel RN  Outcome: Progressing     Problem: ABCDS Injury Assessment  Goal: Absence of physical injury  Outcome: Adequate for Discharge

## 2024-02-01 NOTE — CONSULTS
Clinical Pharmacy Consult Note  Medication History     Admit Date: 1/31/2024    Pharmacy consulted to verify home medication list by Dr. Alonso.    List of of current medications patient is taking is complete. Home Medication list in EPIC updated to reflect changes noted below.    Source of information: Records from [facility]; requested faxed list of pt's medications     Patient's home pharmacy: [facility]     Changes made to medication list:   Medications removed: (include reason, ex: therapy completed, patient no longer taking, etc.)  Lidocaine patches - pt reported as not taking on admit  Naproxen - Rx from 2021  Psyllium - pt reports not taking on admission   Medications added:   Atorvastatin - last filled 10/21/23 x 90d supply  Systane eye drops  Melatonin 6 mg nightly - last filled 12/29/23  Terbinafine cream - last filled 12/29/23  Sildenafil - last filled 12/5/23 x 18 tabs  Vitamin E - last filled 10/25/23 x 100d supply  Medication doses adjusted:   Quetiapine-->900mg nightly; last filled 12/5/23 x 60d supply  Lithium-->300mg in AM / 600mg nightly; last filled 12/5/23 x 30d supply; unclear if pt compliant w/ med?  Calcium + vit D - last filled 12/29/23  Diazepam 10mg po BID - last filled 12/18/23 x 30d supply  Other notes:   Unclear if pt has Albuterol inhaler at home  - [facility]  Unclear if pt is still taking Terazosin - last filled for 4 mg po nightly on 10/25/23 for 90d supply. Pt reported not taking on admit    Current Outpatient Medications   Medication Instructions    albuterol (PROAIR HFA) 108 (90 BASE) MCG/ACT inhaler 2 puffs, Inhalation, EVERY 6 HOURS PRN    atorvastatin (LIPITOR) 80 mg, Oral, DAILY    Calcium Carb-Cholecalciferol (CALCIUM + VITAMIN D3) 500-5 MG-MCG TABS 1 tablet, Oral, 3 TIMES DAILY    diazePAM (VALIUM) 10 mg, Oral, 2 TIMES DAILY    docusate sodium (COLACE) 100 mg, Oral, 2 TIMES DAILY    lithium (LITHOBID) 300 mg, Oral, DAILY BEFORE BREAKFAST    lithium (LITHOBID)

## 2024-02-01 NOTE — PROGRESS NOTES
Admission: Patient received to room 6321 from ED. Patient admitted with Dx of Dyspnea. Patient A&Ox4 upon arrival.  Tele monitor applied, rate and rhythm verified with monitor reader. Admission assessment as charted. VSS. Patient oriented to room, staff, and call system. Educated on fall protocol and hourly rounding. Patient informed to utilize call light with any needs. Pt verbalized understanding. Will continue to monitor.

## 2024-02-05 LAB
EKG ATRIAL RATE: 76 BPM
EKG DIAGNOSIS: NORMAL
EKG P AXIS: 78 DEGREES
EKG P-R INTERVAL: 212 MS
EKG Q-T INTERVAL: 370 MS
EKG QRS DURATION: 104 MS
EKG QTC CALCULATION (BAZETT): 416 MS
EKG R AXIS: 2 DEGREES
EKG T AXIS: 8 DEGREES
EKG VENTRICULAR RATE: 76 BPM

## 2024-02-08 NOTE — DISCHARGE SUMMARY
medications      apixaban 5 MG Tabs tablet  Commonly known as: ELIQUIS  Take 2 tablets by mouth twice daily for 7 days then take 1 tablet by mouth twice daily thereafter.            CONTINUE taking these medications      albuterol sulfate  (90 Base) MCG/ACT inhaler  Commonly known as: ProAir HFA  Inhale 2 puffs into the lungs every 6 hours as needed for Wheezing or Shortness of Breath.     atorvastatin 80 MG tablet  Commonly known as: LIPITOR     diazePAM 10 MG tablet  Commonly known as: VALIUM     docusate sodium 100 MG capsule  Commonly known as: COLACE     * lithium 300 MG extended release tablet  Commonly known as: LITHOBID     * lithium 300 MG extended release tablet  Commonly known as: LITHOBID     QUEtiapine 300 MG tablet  Commonly known as: SEROQUEL     sildenafil 100 MG tablet  Commonly known as: VIAGRA     terbinafine 1 % cream  Commonly known as: LAMISIL           * This list has 2 medication(s) that are the same as other medications prescribed for you. Read the directions carefully, and ask your doctor or other care provider to review them with you.                STOP taking these medications      Calcium + Vitamin D3 500-5 MG-MCG Tabs  Generic drug: Calcium Carb-Cholecalciferol     polyethyl glyc-propyl glyc PF 0.4-0.3 % Soln ophthalmic solution  Commonly known as: SYSTANE     vitamin E 200 units capsule            ASK your doctor about these medications      terazosin 2 MG capsule  Commonly known as: HYTRIN               Where to Get Your Medications        These medications were sent to [id number] - [place], [place] - [address] - P [phone] - F [phone] [address] [facility] [address]      Phone: [phone]   apixaban 5 MG Tabs tablet        Objective Findings at Discharge:   BP (!) 178/80   Pulse 93   Temp 97.5 °F (36.4 °C) (Oral)   Resp 20   Ht 1.803 m (5' 11\")   Wt 88.5 kg (195 lb 1.7 oz)   SpO2 93%   BMI 27.21 kg/m²       Physical

## 2024-10-21 ENCOUNTER — HOSPITAL ENCOUNTER (EMERGENCY)
Age: 69
Discharge: HOME | End: 2024-10-21

## 2024-10-21 VITALS
TEMPERATURE: 98.7 F | HEART RATE: 78 BPM | HEIGHT: 71 IN | SYSTOLIC BLOOD PRESSURE: 128 MMHG | RESPIRATION RATE: 18 BRPM | DIASTOLIC BLOOD PRESSURE: 77 MMHG | OXYGEN SATURATION: 100 % | BODY MASS INDEX: 27.21 KG/M2

## 2024-10-21 ASSESSMENT — PAIN DESCRIPTION - ONSET: ONSET: ON-GOING

## 2024-10-21 ASSESSMENT — PAIN DESCRIPTION - PAIN TYPE: TYPE: ACUTE PAIN

## 2024-10-21 ASSESSMENT — PAIN DESCRIPTION - ORIENTATION: ORIENTATION: MID

## 2024-10-21 ASSESSMENT — PAIN DESCRIPTION - FREQUENCY: FREQUENCY: CONTINUOUS

## 2024-10-21 ASSESSMENT — PAIN SCALES - GENERAL: PAINLEVEL_OUTOF10: 8

## 2024-10-21 ASSESSMENT — PAIN - FUNCTIONAL ASSESSMENT
PAIN_FUNCTIONAL_ASSESSMENT: 0-10
PAIN_FUNCTIONAL_ASSESSMENT: ACTIVITIES ARE NOT PREVENTED

## 2024-10-21 ASSESSMENT — PAIN DESCRIPTION - LOCATION: LOCATION: RECTUM

## 2024-10-21 ASSESSMENT — PAIN DESCRIPTION - DESCRIPTORS: DESCRIPTORS: ACHING

## 2024-10-21 NOTE — ED TRIAGE NOTES
Patient arrived in the ER with c/o rectal bleeding. Patient states that he notice the symptoms a couple of hours ago. Patient has pain in his rectum.

## 2025-01-11 VITALS
OXYGEN SATURATION: 96 % | RESPIRATION RATE: 18 BRPM | TEMPERATURE: 97.1 F | BODY MASS INDEX: 27.2 KG/M2 | SYSTOLIC BLOOD PRESSURE: 160 MMHG | HEART RATE: 79 BPM | DIASTOLIC BLOOD PRESSURE: 83 MMHG | WEIGHT: 195 LBS